# Patient Record
Sex: FEMALE | Race: WHITE | Employment: OTHER | URBAN - METROPOLITAN AREA
[De-identification: names, ages, dates, MRNs, and addresses within clinical notes are randomized per-mention and may not be internally consistent; named-entity substitution may affect disease eponyms.]

---

## 2019-01-07 RX ORDER — SODIUM CHLORIDE 0.9 % (FLUSH) 0.9 %
10 SYRINGE (ML) INJECTION PRN
Status: CANCELLED | OUTPATIENT
Start: 2019-01-07

## 2019-01-08 ENCOUNTER — HOSPITAL ENCOUNTER (OUTPATIENT)
Dept: INTERVENTIONAL RADIOLOGY/VASCULAR | Age: 64
Discharge: HOME | End: 2019-01-08
Payer: MEDICARE

## 2019-01-08 ENCOUNTER — HOSPITAL ENCOUNTER (OUTPATIENT)
Dept: CT IMAGING | Age: 64
Discharge: HOME | End: 2019-01-08
Payer: MEDICARE

## 2019-01-08 VITALS
DIASTOLIC BLOOD PRESSURE: 72 MMHG | RESPIRATION RATE: 16 BRPM | HEART RATE: 80 BPM | SYSTOLIC BLOOD PRESSURE: 172 MMHG | OXYGEN SATURATION: 97 % | TEMPERATURE: 97.4 F

## 2019-01-08 DIAGNOSIS — R80.8 LOW MOLECULAR WEIGHT PROTEINURIA WITH HYPERCALCIURIA AND NEPHROCALCINOSIS: ICD-10-CM

## 2019-01-08 DIAGNOSIS — E83.59 LOW MOLECULAR WEIGHT PROTEINURIA WITH HYPERCALCIURIA AND NEPHROCALCINOSIS: ICD-10-CM

## 2019-01-08 DIAGNOSIS — N29 LOW MOLECULAR WEIGHT PROTEINURIA WITH HYPERCALCIURIA AND NEPHROCALCINOSIS: ICD-10-CM

## 2019-01-08 LAB
APTT: 55.7 SECONDS (ref 21.2–33)
HCT VFR BLD CALC: 34.8 % (ref 37–47)
HCT VFR BLD CALC: 36.3 % (ref 37–47)
HEMOGLOBIN: 11.5 GM/DL (ref 12.5–16)
HEMOGLOBIN: 12 GM/DL (ref 12.5–16)
INR BLD: 1.03 INDEX
MCH RBC QN AUTO: 31.4 PG (ref 27–31)
MCHC RBC AUTO-ENTMCNC: 33.1 % (ref 32–36)
MCV RBC AUTO: 95 FL (ref 78–100)
PDW BLD-RTO: 13 % (ref 11.7–14.9)
PLATELET # BLD: 366 K/CU MM (ref 140–440)
PMV BLD AUTO: 9.3 FL (ref 7.5–11.1)
PROTHROMBIN TIME: 11.9 SECONDS (ref 9.12–12.5)
RBC # BLD: 3.82 M/CU MM (ref 4.2–5.4)
WBC # BLD: 9.5 K/CU MM (ref 4–10.5)

## 2019-01-08 PROCEDURE — 6360000002 HC RX W HCPCS: Performed by: RADIOLOGY

## 2019-01-08 PROCEDURE — 36415 COLL VENOUS BLD VENIPUNCTURE: CPT

## 2019-01-08 PROCEDURE — 88334 PATH CONSLTJ SURG CYTO XM EA: CPT

## 2019-01-08 PROCEDURE — 85018 HEMOGLOBIN: CPT

## 2019-01-08 PROCEDURE — 88305 TISSUE EXAM BY PATHOLOGIST: CPT

## 2019-01-08 PROCEDURE — 7100000010 HC PHASE II RECOVERY - FIRST 15 MIN

## 2019-01-08 PROCEDURE — 2580000003 HC RX 258: Performed by: RADIOLOGY

## 2019-01-08 PROCEDURE — 88333 PATH CONSLTJ SURG CYTO XM 1: CPT

## 2019-01-08 PROCEDURE — 50200 RENAL BIOPSY PERQ: CPT

## 2019-01-08 PROCEDURE — 2709999900 HC NON-CHARGEABLE SUPPLY

## 2019-01-08 PROCEDURE — 76942 ECHO GUIDE FOR BIOPSY: CPT

## 2019-01-08 PROCEDURE — 6370000000 HC RX 637 (ALT 250 FOR IP): Performed by: INTERNAL MEDICINE

## 2019-01-08 PROCEDURE — 85027 COMPLETE CBC AUTOMATED: CPT

## 2019-01-08 PROCEDURE — 85610 PROTHROMBIN TIME: CPT

## 2019-01-08 PROCEDURE — 7100000011 HC PHASE II RECOVERY - ADDTL 15 MIN

## 2019-01-08 PROCEDURE — 85014 HEMATOCRIT: CPT

## 2019-01-08 PROCEDURE — 85730 THROMBOPLASTIN TIME PARTIAL: CPT

## 2019-01-08 RX ORDER — LOVASTATIN 20 MG/1
20 TABLET ORAL EVERY EVENING
COMMUNITY
End: 2021-06-11 | Stop reason: ALTCHOICE

## 2019-01-08 RX ORDER — SODIUM CHLORIDE 0.9 % (FLUSH) 0.9 %
10 SYRINGE (ML) INJECTION PRN
Status: DISCONTINUED | OUTPATIENT
Start: 2019-01-08 | End: 2019-01-09 | Stop reason: HOSPADM

## 2019-01-08 RX ORDER — ONDANSETRON 4 MG/1
4 TABLET, FILM COATED ORAL EVERY 8 HOURS PRN
COMMUNITY
End: 2021-06-11 | Stop reason: ALTCHOICE

## 2019-01-08 RX ORDER — ALLOPURINOL 100 MG/1
100 TABLET ORAL DAILY
COMMUNITY
End: 2021-06-11 | Stop reason: ALTCHOICE

## 2019-01-08 RX ORDER — HYDRALAZINE HYDROCHLORIDE 25 MG/1
25 TABLET, FILM COATED ORAL ONCE
Status: COMPLETED | OUTPATIENT
Start: 2019-01-08 | End: 2019-01-08

## 2019-01-08 RX ADMIN — HYDRALAZINE HYDROCHLORIDE 10 MG: 20 INJECTION INTRAMUSCULAR; INTRAVENOUS at 10:50

## 2019-01-08 RX ADMIN — HYDRALAZINE HYDROCHLORIDE 25 MG: 25 TABLET ORAL at 13:00

## 2019-01-08 ASSESSMENT — PAIN SCALES - GENERAL
PAINLEVEL_OUTOF10: 0

## 2019-01-08 ASSESSMENT — PAIN - FUNCTIONAL ASSESSMENT
PAIN_FUNCTIONAL_ASSESSMENT: 0-10
PAIN_FUNCTIONAL_ASSESSMENT: 0-10

## 2019-06-25 ENCOUNTER — HOSPITAL ENCOUNTER (OUTPATIENT)
Dept: INTERVENTIONAL RADIOLOGY/VASCULAR | Age: 64
Discharge: HOME | End: 2019-06-25
Payer: MEDICARE

## 2019-06-25 VITALS
SYSTOLIC BLOOD PRESSURE: 138 MMHG | WEIGHT: 146 LBS | BODY MASS INDEX: 29.43 KG/M2 | DIASTOLIC BLOOD PRESSURE: 70 MMHG | HEIGHT: 59 IN | TEMPERATURE: 96.8 F | RESPIRATION RATE: 18 BRPM | OXYGEN SATURATION: 98 % | HEART RATE: 62 BPM

## 2019-06-25 DIAGNOSIS — N18.6 ESRD (END STAGE RENAL DISEASE) (HCC): ICD-10-CM

## 2019-06-25 LAB
APTT: 65.8 SECONDS (ref 21.2–33)
GLUCOSE BLD-MCNC: 97 MG/DL (ref 70–99)
HCT VFR BLD CALC: 24.7 % (ref 37–47)
HEMOGLOBIN: 7.7 GM/DL (ref 12.5–16)
INR BLD: 1.03 INDEX
MCH RBC QN AUTO: 31.8 PG (ref 27–31)
MCHC RBC AUTO-ENTMCNC: 31.2 % (ref 32–36)
MCV RBC AUTO: 102.1 FL (ref 78–100)
PDW BLD-RTO: 13.2 % (ref 11.7–14.9)
PLATELET # BLD: 317 K/CU MM (ref 140–440)
PMV BLD AUTO: 9.7 FL (ref 7.5–11.1)
PROTHROMBIN TIME: 11.7 SECONDS (ref 9.12–12.5)
RBC # BLD: 2.42 M/CU MM (ref 4.2–5.4)
REASON FOR REJECTION: NORMAL
REASON FOR REJECTION: NORMAL
REJECTED TEST: NORMAL
WBC # BLD: 8.5 K/CU MM (ref 4–10.5)

## 2019-06-25 PROCEDURE — 85027 COMPLETE CBC AUTOMATED: CPT

## 2019-06-25 PROCEDURE — 6360000002 HC RX W HCPCS: Performed by: RADIOLOGY

## 2019-06-25 PROCEDURE — C1751 CATH, INF, PER/CENT/MIDLINE: HCPCS

## 2019-06-25 PROCEDURE — 85610 PROTHROMBIN TIME: CPT

## 2019-06-25 PROCEDURE — C1894 INTRO/SHEATH, NON-LASER: HCPCS

## 2019-06-25 PROCEDURE — 7100000010 HC PHASE II RECOVERY - FIRST 15 MIN

## 2019-06-25 PROCEDURE — 76937 US GUIDE VASCULAR ACCESS: CPT

## 2019-06-25 PROCEDURE — 84132 ASSAY OF SERUM POTASSIUM: CPT

## 2019-06-25 PROCEDURE — 2580000003 HC RX 258: Performed by: RADIOLOGY

## 2019-06-25 PROCEDURE — 7100000011 HC PHASE II RECOVERY - ADDTL 15 MIN

## 2019-06-25 PROCEDURE — 2709999900 HC NON-CHARGEABLE SUPPLY

## 2019-06-25 PROCEDURE — 82962 GLUCOSE BLOOD TEST: CPT

## 2019-06-25 PROCEDURE — 77001 FLUOROGUIDE FOR VEIN DEVICE: CPT

## 2019-06-25 PROCEDURE — 85730 THROMBOPLASTIN TIME PARTIAL: CPT

## 2019-06-25 PROCEDURE — 36558 INSERT TUNNELED CV CATH: CPT

## 2019-06-25 RX ORDER — HYDRALAZINE HYDROCHLORIDE 50 MG/1
50 TABLET, FILM COATED ORAL 3 TIMES DAILY
COMMUNITY
End: 2021-06-11 | Stop reason: ALTCHOICE

## 2019-06-25 RX ORDER — SODIUM BICARBONATE 650 MG/1
650 TABLET ORAL 3 TIMES DAILY
Status: ON HOLD | COMMUNITY
End: 2019-08-03 | Stop reason: ALTCHOICE

## 2019-06-25 RX ORDER — MIDAZOLAM HYDROCHLORIDE 1 MG/ML
1.5 INJECTION INTRAMUSCULAR; INTRAVENOUS ONCE
Status: COMPLETED | OUTPATIENT
Start: 2019-06-25 | End: 2019-06-25

## 2019-06-25 RX ORDER — SODIUM CHLORIDE 0.9 % (FLUSH) 0.9 %
10 SYRINGE (ML) INJECTION PRN
Status: DISCONTINUED | OUTPATIENT
Start: 2019-06-25 | End: 2019-06-26 | Stop reason: HOSPADM

## 2019-06-25 RX ORDER — FENTANYL CITRATE 50 UG/ML
75 INJECTION, SOLUTION INTRAMUSCULAR; INTRAVENOUS ONCE
Status: COMPLETED | OUTPATIENT
Start: 2019-06-25 | End: 2019-06-25

## 2019-06-25 RX ADMIN — FENTANYL CITRATE 75 MCG: 50 INJECTION INTRAMUSCULAR; INTRAVENOUS at 14:56

## 2019-06-25 RX ADMIN — SODIUM CHLORIDE, PRESERVATIVE FREE 10 ML: 5 INJECTION INTRAVENOUS at 12:31

## 2019-06-25 RX ADMIN — MIDAZOLAM HYDROCHLORIDE 1.5 MG: 1 INJECTION, SOLUTION INTRAMUSCULAR; INTRAVENOUS at 14:57

## 2019-06-25 ASSESSMENT — PAIN SCALES - GENERAL
PAINLEVEL_OUTOF10: 0
PAINLEVEL_OUTOF10: 0

## 2019-06-25 NOTE — H&P
Date:6/25/2019  Name:Lupe Toure   XNI:[id number]   CU#:[id number]    SEX:female   Referring Physician:  Ann Almanzar  Primary Physician:  Alvin  Chief Complaint:  Renal insufficiency  History of Present Illness:   Renal insufficency    HISTORY AND PHYSICAL  ESRD (end stage renal disease) ([facility] [facility] [N18.6]    Past Medical History:  Past Medical History:   Diagnosis Date    Abscess, perineum 1/6/2016    Chronic kidney disease     CKD stage 4 - for renal bx 1/8/2019    Diabetes 1.5, managed as type 1 ([facility] [address] 1/6/2016    Diabetes mellitus ([facility] [address]     20+ yrs    Gout     on allopurinal    Hemodialysis patient ([facility] [address]     per notes per Dr Ann Almanzar- pt was on HD for 2 months in 2011 due to ATN/ per pt on 6/24/2019\"tried to do dialysis 2 weeks ago and did not work- could not finish it so now putting in dialysis cath ( placement) 6/25/2019    Hx of pancreatitis     2011    Hyperlipidemia     Hypertension     Macular degeneration     \"right eyes- have to use magnifying glass\"    Open wound of genital labia     Proteinuria        Past Surgical History:  Past Surgical History:   Procedure Laterality Date    AV FISTULA CREATION Right 03/2019    \"done at [facility]\"    EYE SURGERY      \"laser surgery left eye and radiation in my right eye and surgery both eyes for cataract [id number]\"    FOOT SURGERY  1987    toe nail removed- dinora     KIDNEY BIOPSY      per old chart done 1/2019    TUBAL LIGATION  40+ yrs ago    TUNNELED VENOUS CATHETER PLACEMENT Left 06/25/2019    Dr Irean Cranker       Social History:  Social History     Socioeconomic History    Marital status:       Spouse name: Not on file    Number of children: Not on file    Years of education: Not on file    Highest education level: Not on file   Occupational History    Not on file   Social Needs    Financial resource strain: Not on file    Food insecurity:     Worry: Not on file     Inability: Not on file    Transportation needs:     Medical: Not on file Non-medical: Not on file   Tobacco Use    Smoking status: Former Smoker     Packs/day: 1.25     Years: 45.00     Pack years: 56.25     Types: Cigarettes     Last attempt to quit: 2017     Years since quittin.4    Smokeless tobacco: Never Used   Substance and Sexual Activity    Alcohol use: No    Drug use: No    Sexual activity: Not Currently   Lifestyle    Physical activity:     Days per week: Not on file     Minutes per session: Not on file    Stress: Not on file   Relationships    Social connections:     Talks on phone: Not on file     Gets together: Not on file     Attends [employer] service: Not on file     Active member of club or organization: Not on file     Attends meetings of clubs or organizations: Not on file     Relationship status: Not on file    Intimate partner violence:     Fear of current or ex partner: Not on file     Emotionally abused: Not on file     Physically abused: Not on file     Forced sexual activity: Not on file   Other Topics Concern    Not on file   Social History Narrative    Not on file       Family History:  Family History   Problem Relation Age of Onset    Diabetes Mother     High Blood Pressure Mother     Heart Disease Father        Allergies:   Allergies   Allergen Reactions    Tape [place] Tape] Itching and Rash     Paper tape OK      Amlodipine Besylate      Passed out     Terramycin [Oxytetracycline] Other (See Comments)     \"swelling of my face\"       Medications:  Current Outpatient Medications on File Prior to Encounter   Medication Sig Dispense Refill    sodium bicarbonate 650 MG tablet Take 650 mg by mouth 3 times daily      hydrALAZINE (APRESOLINE) 50 MG tablet Take 50 mg by mouth 3 times daily      insulin detemir (LEVEMIR) 100 UNIT/ML injection vial Inject 51 Units into the skin nightly       lovastatin (MEVACOR) 20 MG tablet Take 20 mg by mouth every evening      allopurinol (ZYLOPRIM) 100 MG tablet Take 100 mg by mouth daily      ondansetron (ZOFRAN) 4 MG tablet Take 4 mg by mouth every 8 hours as needed for Nausea or Vomiting      carvedilol (COREG) 25 MG tablet Take 25 mg by mouth 2 times daily (with meals)      furosemide (LASIX) 40 MG tablet Take 80 mg by mouth daily       gemfibrozil (LOPID) 600 MG tablet Take 600 mg by mouth 2 times daily (before meals)      dapagliflozin (FARXIGA) 10 MG tablet Take 10 mg by mouth every morning      gabapentin (NEURONTIN) 300 MG capsule Take 300 mg by mouth 3 times daily      glipiZIDE (GLUCOTROL) 10 MG tablet Take 10 mg by mouth 2 times daily (before meals)      insulin lispro (HUMALOG) 100 UNIT/ML injection cartridge Inject 10 Units into the skin 3 times daily (before meals) Sliding scale       LOSARTAN POTASSIUM PO Take by mouth      METFORMIN HCL PO Take 500 mg by mouth      pravastatin (PRAVACHOL) 40 MG tablet Take 40 mg by mouth daily       No current facility-administered medications on file prior to encounter. Vital Signs:  @FLOWDT(6:last)@ @FLOWSTATM(6:24)@ @FLOWDT(5:last)@ @FLOWDT(8:last)@ @FLOWDT(9:last)@ @FLOWDT(10:last)@   @FLOWDT(14:first)@  @FLOWDT(14:last)@  Body mass index is 29.49 kg/m². Laboratory:  Recent Labs     06/25/19  1230   WBC 8.5   INR 1.03     INR @LABR24(INR)@      Impression:  Active Problems:    * No active hospital problems. *  Resolved Problems:    * No resolved hospital problems.  *          Mallampati Score 2  ASA class 2    PLAN OF CARE/PLANNED PROCEDURE    IR TUNNELED CATHETER PLACEMENT GREATER THAN 5 YEARS [34346]

## 2019-08-03 ENCOUNTER — HOSPITAL ENCOUNTER (INPATIENT)
Age: 64
LOS: 3 days | Discharge: HOME | DRG: 280 | End: 2019-08-06
Attending: INTERNAL MEDICINE | Admitting: INTERNAL MEDICINE
Payer: MEDICARE

## 2019-08-03 ENCOUNTER — APPOINTMENT (OUTPATIENT)
Dept: GENERAL RADIOLOGY | Age: 64
DRG: 280 | End: 2019-08-03
Attending: INTERNAL MEDICINE
Payer: MEDICARE

## 2019-08-03 DIAGNOSIS — N28.9 RENAL INSUFFICIENCY: ICD-10-CM

## 2019-08-03 PROBLEM — R00.1 SINUS BRADYCARDIA: Status: ACTIVE | Noted: 2019-08-03

## 2019-08-03 LAB
ALBUMIN SERPL-MCNC: 2.9 GM/DL (ref 3.4–5)
ALP BLD-CCNC: 101 IU/L (ref 40–129)
ALT SERPL-CCNC: 7 U/L (ref 10–40)
ANION GAP SERPL CALCULATED.3IONS-SCNC: 11 MMOL/L (ref 4–16)
APTT: 86.6 SECONDS (ref 21.2–33)
AST SERPL-CCNC: 12 IU/L (ref 15–37)
BASOPHILS ABSOLUTE: 0 K/CU MM
BASOPHILS RELATIVE PERCENT: 0.2 % (ref 0–1)
BILIRUB SERPL-MCNC: 0.1 MG/DL (ref 0–1)
BILIRUBIN DIRECT: 0.1 MG/DL (ref 0–0.3)
BILIRUBIN, INDIRECT: 0 MG/DL (ref 0–0.7)
BUN BLDV-MCNC: 18 MG/DL (ref 6–23)
CALCIUM SERPL-MCNC: 8.2 MG/DL (ref 8.3–10.6)
CHLORIDE BLD-SCNC: 103 MMOL/L (ref 99–110)
CO2: 24 MMOL/L (ref 21–32)
CREAT SERPL-MCNC: 4.1 MG/DL (ref 0.6–1.1)
DIFFERENTIAL TYPE: ABNORMAL
EOSINOPHILS ABSOLUTE: 0.1 K/CU MM
EOSINOPHILS RELATIVE PERCENT: 1.6 % (ref 0–3)
ESTIMATED AVERAGE GLUCOSE: 94 MG/DL
FERRITIN: 668 NG/ML (ref 15–150)
FOLATE: 13.3 NG/ML (ref 3.1–17.5)
GFR AFRICAN AMERICAN: 13 ML/MIN/1.73M2
GFR NON-AFRICAN AMERICAN: 11 ML/MIN/1.73M2
GLUCOSE BLD-MCNC: 118 MG/DL (ref 70–99)
GLUCOSE BLD-MCNC: 122 MG/DL (ref 70–99)
GLUCOSE BLD-MCNC: 84 MG/DL (ref 70–99)
GLUCOSE BLD-MCNC: 88 MG/DL (ref 70–99)
HBA1C MFR BLD: 4.9 % (ref 4.2–6.3)
HCT VFR BLD CALC: 23.5 % (ref 37–47)
HEMOGLOBIN: ABNORMAL GM/DL (ref 12.5–16)
IMMATURE NEUTROPHIL %: 0.5 % (ref 0–0.43)
INR BLD: 1.31 INDEX
IRON: 30 UG/DL (ref 37–145)
LYMPHOCYTES ABSOLUTE: 1.6 K/CU MM
LYMPHOCYTES RELATIVE PERCENT: 18.9 % (ref 24–44)
MAGNESIUM: 2.2 MG/DL (ref 1.8–2.4)
MCH RBC QN AUTO: 33.3 PG (ref 27–31)
MCHC RBC AUTO-ENTMCNC: 29.4 % (ref 32–36)
MCV RBC AUTO: 113.5 FL (ref 78–100)
MONOCYTES ABSOLUTE: 1 K/CU MM
MONOCYTES RELATIVE PERCENT: 11.4 % (ref 0–4)
NUCLEATED RBC %: 0 %
PCT TRANSFERRIN: 13 % (ref 10–44)
PDW BLD-RTO: 15.9 % (ref 11.7–14.9)
PLATELET # BLD: 306 K/CU MM (ref 140–440)
PMV BLD AUTO: 9.4 FL (ref 7.5–11.1)
POTASSIUM SERPL-SCNC: 4.1 MMOL/L (ref 3.5–5.1)
PROTHROMBIN TIME: 14.9 SECONDS (ref 9.12–12.5)
RBC # BLD: 2.07 M/CU MM (ref 4.2–5.4)
SEGMENTED NEUTROPHILS ABSOLUTE COUNT: 5.7 K/CU MM
SEGMENTED NEUTROPHILS RELATIVE PERCENT: 67.4 % (ref 36–66)
SODIUM BLD-SCNC: 138 MMOL/L (ref 135–145)
TOTAL IMMATURE NEUTOROPHIL: 0.04 K/CU MM
TOTAL IRON BINDING CAPACITY: 236 UG/DL (ref 250–450)
TOTAL NUCLEATED RBC: 0 K/CU MM
TOTAL PROTEIN: 4.8 GM/DL (ref 6.4–8.2)
TROPONIN T: 0.15 NG/ML
TROPONIN T: 0.16 NG/ML
TSH HIGH SENSITIVITY: 1.78 UIU/ML (ref 0.27–4.2)
UNSATURATED IRON BINDING CAPACITY: 206 UG/DL (ref 110–370)
VITAMIN B-12: 908.4 PG/ML (ref 211–911)
WBC # BLD: 8.4 K/CU MM (ref 4–10.5)

## 2019-08-03 PROCEDURE — 1200000000 HC SEMI PRIVATE

## 2019-08-03 PROCEDURE — 86922 COMPATIBILITY TEST ANTIGLOB: CPT

## 2019-08-03 PROCEDURE — 82746 ASSAY OF FOLIC ACID SERUM: CPT

## 2019-08-03 PROCEDURE — 85730 THROMBOPLASTIN TIME PARTIAL: CPT

## 2019-08-03 PROCEDURE — 84484 ASSAY OF TROPONIN QUANT: CPT

## 2019-08-03 PROCEDURE — 6360000002 HC RX W HCPCS: Performed by: INTERNAL MEDICINE

## 2019-08-03 PROCEDURE — 84443 ASSAY THYROID STIM HORMONE: CPT

## 2019-08-03 PROCEDURE — 86901 BLOOD TYPING SEROLOGIC RH(D): CPT

## 2019-08-03 PROCEDURE — 82962 GLUCOSE BLOOD TEST: CPT

## 2019-08-03 PROCEDURE — 2580000003 HC RX 258: Performed by: INTERNAL MEDICINE

## 2019-08-03 PROCEDURE — 83550 IRON BINDING TEST: CPT

## 2019-08-03 PROCEDURE — 94761 N-INVAS EAR/PLS OXIMETRY MLT: CPT

## 2019-08-03 PROCEDURE — 85025 COMPLETE CBC W/AUTO DIFF WBC: CPT

## 2019-08-03 PROCEDURE — 83036 HEMOGLOBIN GLYCOSYLATED A1C: CPT

## 2019-08-03 PROCEDURE — 71045 X-RAY EXAM CHEST 1 VIEW: CPT

## 2019-08-03 PROCEDURE — 36415 COLL VENOUS BLD VENIPUNCTURE: CPT

## 2019-08-03 PROCEDURE — 99221 1ST HOSP IP/OBS SF/LOW 40: CPT | Performed by: INTERNAL MEDICINE

## 2019-08-03 PROCEDURE — 71046 X-RAY EXAM CHEST 2 VIEWS: CPT

## 2019-08-03 PROCEDURE — 93005 ELECTROCARDIOGRAM TRACING: CPT | Performed by: INTERNAL MEDICINE

## 2019-08-03 PROCEDURE — 86900 BLOOD TYPING SEROLOGIC ABO: CPT

## 2019-08-03 PROCEDURE — P9016 RBC LEUKOCYTES REDUCED: HCPCS

## 2019-08-03 PROCEDURE — 85610 PROTHROMBIN TIME: CPT

## 2019-08-03 PROCEDURE — 6370000000 HC RX 637 (ALT 250 FOR IP): Performed by: INTERNAL MEDICINE

## 2019-08-03 PROCEDURE — 82607 VITAMIN B-12: CPT

## 2019-08-03 PROCEDURE — 86850 RBC ANTIBODY SCREEN: CPT

## 2019-08-03 PROCEDURE — 36430 TRANSFUSION BLD/BLD COMPNT: CPT

## 2019-08-03 PROCEDURE — 82728 ASSAY OF FERRITIN: CPT

## 2019-08-03 PROCEDURE — 83540 ASSAY OF IRON: CPT

## 2019-08-03 PROCEDURE — 83735 ASSAY OF MAGNESIUM: CPT

## 2019-08-03 PROCEDURE — 80053 COMPREHEN METABOLIC PANEL: CPT

## 2019-08-03 PROCEDURE — 82248 BILIRUBIN DIRECT: CPT

## 2019-08-03 RX ORDER — NICOTINE POLACRILEX 4 MG
15 LOZENGE BUCCAL PRN
Status: DISCONTINUED | OUTPATIENT
Start: 2019-08-03 | End: 2019-08-06 | Stop reason: HOSPADM

## 2019-08-03 RX ORDER — SODIUM CHLORIDE 0.9 % (FLUSH) 0.9 %
10 SYRINGE (ML) INJECTION EVERY 12 HOURS SCHEDULED
Status: DISCONTINUED | OUTPATIENT
Start: 2019-08-03 | End: 2019-08-06 | Stop reason: HOSPADM

## 2019-08-03 RX ORDER — HEPARIN SODIUM 5000 [USP'U]/ML
5000 INJECTION, SOLUTION INTRAVENOUS; SUBCUTANEOUS EVERY 8 HOURS SCHEDULED
Status: DISCONTINUED | OUTPATIENT
Start: 2019-08-03 | End: 2019-08-06 | Stop reason: HOSPADM

## 2019-08-03 RX ORDER — GEMFIBROZIL 600 MG/1
600 TABLET, FILM COATED ORAL
Status: DISCONTINUED | OUTPATIENT
Start: 2019-08-04 | End: 2019-08-06 | Stop reason: HOSPADM

## 2019-08-03 RX ORDER — FUROSEMIDE 40 MG/1
80 TABLET ORAL DAILY
Status: DISCONTINUED | OUTPATIENT
Start: 2019-08-03 | End: 2019-08-03

## 2019-08-03 RX ORDER — HYDRALAZINE HYDROCHLORIDE 50 MG/1
50 TABLET, FILM COATED ORAL 3 TIMES DAILY
Status: DISCONTINUED | OUTPATIENT
Start: 2019-08-03 | End: 2019-08-03

## 2019-08-03 RX ORDER — GABAPENTIN 300 MG/1
300 CAPSULE ORAL 3 TIMES DAILY
Status: DISCONTINUED | OUTPATIENT
Start: 2019-08-03 | End: 2019-08-06 | Stop reason: HOSPADM

## 2019-08-03 RX ORDER — SEVELAMER CARBONATE 800 MG/1
1 TABLET, FILM COATED ORAL 3 TIMES DAILY
COMMUNITY
End: 2021-06-11 | Stop reason: ALTCHOICE

## 2019-08-03 RX ORDER — INSULIN GLARGINE 100 [IU]/ML
5 INJECTION, SOLUTION SUBCUTANEOUS NIGHTLY
Status: DISCONTINUED | OUTPATIENT
Start: 2019-08-03 | End: 2019-08-06 | Stop reason: HOSPADM

## 2019-08-03 RX ORDER — SEVELAMER CARBONATE 800 MG/1
800 TABLET, FILM COATED ORAL 3 TIMES DAILY
Status: DISCONTINUED | OUTPATIENT
Start: 2019-08-03 | End: 2019-08-06 | Stop reason: HOSPADM

## 2019-08-03 RX ORDER — PRAVASTATIN SODIUM 40 MG
40 TABLET ORAL NIGHTLY
Status: DISCONTINUED | OUTPATIENT
Start: 2019-08-03 | End: 2019-08-06 | Stop reason: HOSPADM

## 2019-08-03 RX ORDER — DEXTROSE MONOHYDRATE 25 G/50ML
12.5 INJECTION, SOLUTION INTRAVENOUS PRN
Status: DISCONTINUED | OUTPATIENT
Start: 2019-08-03 | End: 2019-08-06 | Stop reason: HOSPADM

## 2019-08-03 RX ORDER — 0.9 % SODIUM CHLORIDE 0.9 %
250 INTRAVENOUS SOLUTION INTRAVENOUS ONCE
Status: COMPLETED | OUTPATIENT
Start: 2019-08-03 | End: 2019-08-04

## 2019-08-03 RX ORDER — SODIUM BICARBONATE 650 MG/1
650 TABLET ORAL 3 TIMES DAILY
Status: DISCONTINUED | OUTPATIENT
Start: 2019-08-03 | End: 2019-08-05

## 2019-08-03 RX ORDER — ALLOPURINOL 100 MG/1
100 TABLET ORAL DAILY
Status: DISCONTINUED | OUTPATIENT
Start: 2019-08-03 | End: 2019-08-06 | Stop reason: HOSPADM

## 2019-08-03 RX ORDER — DEXTROSE MONOHYDRATE 50 MG/ML
100 INJECTION, SOLUTION INTRAVENOUS PRN
Status: DISCONTINUED | OUTPATIENT
Start: 2019-08-03 | End: 2019-08-06 | Stop reason: HOSPADM

## 2019-08-03 RX ORDER — SODIUM CHLORIDE 0.9 % (FLUSH) 0.9 %
10 SYRINGE (ML) INJECTION PRN
Status: DISCONTINUED | OUTPATIENT
Start: 2019-08-03 | End: 2019-08-06 | Stop reason: HOSPADM

## 2019-08-03 RX ORDER — ONDANSETRON 2 MG/ML
4 INJECTION INTRAMUSCULAR; INTRAVENOUS EVERY 6 HOURS PRN
Status: DISCONTINUED | OUTPATIENT
Start: 2019-08-03 | End: 2019-08-06 | Stop reason: HOSPADM

## 2019-08-03 RX ADMIN — HEPARIN SODIUM 5000 UNITS: 5000 INJECTION INTRAVENOUS; SUBCUTANEOUS at 12:58

## 2019-08-03 RX ADMIN — ALLOPURINOL 100 MG: 100 TABLET ORAL at 12:57

## 2019-08-03 RX ADMIN — SODIUM BICARBONATE 650 MG TABLET 650 MG: at 21:11

## 2019-08-03 RX ADMIN — FUROSEMIDE 80 MG: 40 TABLET ORAL at 12:57

## 2019-08-03 RX ADMIN — ONDANSETRON 4 MG: 2 INJECTION INTRAMUSCULAR; INTRAVENOUS at 11:04

## 2019-08-03 RX ADMIN — SODIUM CHLORIDE, PRESERVATIVE FREE 10 ML: 5 INJECTION INTRAVENOUS at 21:07

## 2019-08-03 RX ADMIN — SODIUM CHLORIDE, PRESERVATIVE FREE 10 ML: 5 INJECTION INTRAVENOUS at 12:15

## 2019-08-03 RX ADMIN — SODIUM CHLORIDE 250 ML: 9 INJECTION, SOLUTION INTRAVENOUS at 21:07

## 2019-08-03 RX ADMIN — PRAVASTATIN SODIUM 40 MG: 40 TABLET ORAL at 21:10

## 2019-08-03 ASSESSMENT — PAIN SCALES - GENERAL
PAINLEVEL_OUTOF10: 0

## 2019-08-03 NOTE — CONSULTS
(PRAVACHOL) tablet 40 mg Nightly   sodium bicarbonate tablet 650 mg TID   sodium chloride flush 0.9 % injection 10 mL 2 times per day   sodium chloride flush 0.9 % injection 10 mL PRN   magnesium hydroxide (MILK OF MAGNESIA) 400 MG/5ML suspension 30 mL Daily PRN   ondansetron (ZOFRAN) injection 4 mg Q6H PRN   heparin (porcine) injection 5,000 Units 3 times per day   glucose (GLUTOSE) 40 % oral gel 15 g PRN   dextrose 50 % IV solution PRN   glucagon (rDNA) injection 1 mg PRN   dextrose 5 % solution PRN   insulin lispro (HUMALOG) injection vial 0-6 Units TID WC   insulin lispro (HUMALOG) injection vial 0-3 Units Nightly   sevelamer (RENVELA) tablet 800 mg TID   0.9 % sodium chloride bolus Once     Current Facility-Administered Medications   Medication Dose Route Frequency Provider Last Rate Last Dose    allopurinol (ZYLOPRIM) tablet 100 mg  100 mg Oral Daily Veronica Cavazos MD   100 mg at 08/03/19 1257    gabapentin (NEURONTIN) capsule 300 mg  300 mg Oral TID Veronica Cavazos MD        [START ON 8/4/2019] gemfibrozil (LOPID) tablet 600 mg  600 mg Oral BID AC Veronica Cavazos MD        insulin glargine (LANTUS) injection vial 5 Units  5 Units Subcutaneous Nightly Veronica Cavazos MD        pravastatin (PRAVACHOL) tablet 40 mg  40 mg Oral Nightly Veronica Cavazos MD        sodium bicarbonate tablet 650 mg  650 mg Oral TID Veronica Cavazos MD        sodium chloride flush 0.9 % injection 10 mL  10 mL Intravenous 2 times per day Veronica Cavazos MD   10 mL at 08/03/19 1215    sodium chloride flush 0.9 % injection 10 mL  10 mL Intravenous PRN Veronica Cavazos MD        magnesium hydroxide (MILK OF MAGNESIA) 400 MG/5ML suspension 30 mL  30 mL Oral Daily PRN Veronica Cavazos MD        ondansetron [facility]) injection 4 mg  4 mg Intravenous Q6H PRN Veronica Cavazos MD   4 mg at 08/03/19 1104    heparin (porcine) injection 5,000 Units  5,000 Units Subcutaneous 3 times per day Juju IBANEZ kg/m². General Appearance:  No distress, conversant    Constitutional:  Well developed, Well nourished, No acute distress, Non-toxic appearance. HENT:  Normocephalic, Atraumatic, Bilateral external ears normal, Oropharynx moist, No oral exudates, Nose normal. Neck- Normal range of motion, No tenderness, Supple, No stridor,no apical-carotid delay, no carotid bruit  Eyes:  PERRL, EOMI, Conjunctiva normal, No discharge. Respiratory:  Normal breath sounds, No respiratory distress, No wheezing, No chest tenderness. ,no use of accessory muscles, diaphragm movement is normal  Cardiovascular: (PMI) apex non displaced,no lifts no thrills, no s3,no s4, Normal heart rate, Normal rhythm, No murmurs, No rubs, No gallops. Carotid arteries pulse and amplitude are normal no bruit, no abdominal bruit noted ( normal abdominal aorta ausculation), femoral arteries pulse and amplitude are normal no bruit, pedal pulses are normal  GI:  Bowel sounds normal, Soft, No tenderness, No masses, No pulsatile masses, no hepatosplenomegally, no bruits  : External genitalia appear normal, No masses or lesions. No discharge. No CVA tenderness. Musculoskeletal:  Intact distal pulses, No edema, No tenderness, No cyanosis, No clubbing. Good range of motion in all major joints. No tenderness to palpation or major deformities noted. Back- No tenderness. Integument:  Warm, Dry, No erythema, No rash. Skin: no rash, no ulcers  Lymphatic:  No lymphadenopathy noted. Neurologic:  Alert & oriented x 3, Normal motor function, Normal sensory function, No focal deficits noted.    Psychiatric:  Affect normal, Judgment normal, Mood normal.   Lab Review   Recent Labs     08/03/19  1135   WBC 8.4   HGB 6.9  HGB CALLED TO YURY DE LA CRUZ RN ON 08/03/2019 @ 1211 BY VINCE SMITH   RESULTS READ BACK  *   HCT 23.5*         Recent Labs     08/03/19  1135      K 4.1      CO2 24   BUN 18   CREATININE 4.1*     Recent Labs     08/03/19  1135   AST

## 2019-08-04 LAB
ABO/RH: NORMAL
ANION GAP SERPL CALCULATED.3IONS-SCNC: 11 MMOL/L (ref 4–16)
ANTIBODY SCREEN: NEGATIVE
BASOPHILS ABSOLUTE: 0.1 K/CU MM
BASOPHILS RELATIVE PERCENT: 0.5 % (ref 0–1)
BUN BLDV-MCNC: 23 MG/DL (ref 6–23)
CALCIUM SERPL-MCNC: 8.2 MG/DL (ref 8.3–10.6)
CHLORIDE BLD-SCNC: 102 MMOL/L (ref 99–110)
CO2: 24 MMOL/L (ref 21–32)
COMPONENT: NORMAL
CREAT SERPL-MCNC: 5.1 MG/DL (ref 0.6–1.1)
CROSSMATCH RESULT: NORMAL
DIFFERENTIAL TYPE: ABNORMAL
EKG ATRIAL RATE: 86 BPM
EKG DIAGNOSIS: NORMAL
EKG P AXIS: 34 DEGREES
EKG Q-T INTERVAL: 416 MS
EKG QRS DURATION: 76 MS
EKG QTC CALCULATION (BAZETT): 439 MS
EKG R AXIS: 12 DEGREES
EKG T AXIS: 29 DEGREES
EKG VENTRICULAR RATE: 67 BPM
EOSINOPHILS ABSOLUTE: 0.2 K/CU MM
EOSINOPHILS RELATIVE PERCENT: 1.6 % (ref 0–3)
GFR AFRICAN AMERICAN: 10 ML/MIN/1.73M2
GFR NON-AFRICAN AMERICAN: 8 ML/MIN/1.73M2
GLUCOSE BLD-MCNC: 101 MG/DL (ref 70–99)
GLUCOSE BLD-MCNC: 117 MG/DL (ref 70–99)
GLUCOSE BLD-MCNC: 74 MG/DL (ref 70–99)
GLUCOSE BLD-MCNC: 81 MG/DL (ref 70–99)
GLUCOSE BLD-MCNC: 92 MG/DL (ref 70–99)
HCT VFR BLD CALC: 26.3 % (ref 37–47)
HEMOGLOBIN: 8.3 GM/DL (ref 12.5–16)
IMMATURE NEUTROPHIL %: 0.4 % (ref 0–0.43)
LYMPHOCYTES ABSOLUTE: 1.6 K/CU MM
LYMPHOCYTES RELATIVE PERCENT: 15.8 % (ref 24–44)
MAGNESIUM: 2.1 MG/DL (ref 1.8–2.4)
MCH RBC QN AUTO: 32.4 PG (ref 27–31)
MCHC RBC AUTO-ENTMCNC: 31.6 % (ref 32–36)
MCV RBC AUTO: 102.7 FL (ref 78–100)
MONOCYTES ABSOLUTE: 1 K/CU MM
MONOCYTES RELATIVE PERCENT: 10.2 % (ref 0–4)
NUCLEATED RBC %: 0 %
PDW BLD-RTO: 17.1 % (ref 11.7–14.9)
PLATELET # BLD: 287 K/CU MM (ref 140–440)
PMV BLD AUTO: 9.2 FL (ref 7.5–11.1)
POTASSIUM SERPL-SCNC: 4.1 MMOL/L (ref 3.5–5.1)
RBC # BLD: 2.56 M/CU MM (ref 4.2–5.4)
SEGMENTED NEUTROPHILS ABSOLUTE COUNT: 7.1 K/CU MM
SEGMENTED NEUTROPHILS RELATIVE PERCENT: 71.5 % (ref 36–66)
SODIUM BLD-SCNC: 137 MMOL/L (ref 135–145)
STATUS: NORMAL
TOTAL IMMATURE NEUTOROPHIL: 0.04 K/CU MM
TOTAL NUCLEATED RBC: 0 K/CU MM
TRANSFUSION STATUS: NORMAL
TROPONIN T: 0.15 NG/ML
UNIT DIVISION: 0
UNIT NUMBER: NORMAL
WBC # BLD: 9.9 K/CU MM (ref 4–10.5)

## 2019-08-04 PROCEDURE — 1200000000 HC SEMI PRIVATE

## 2019-08-04 PROCEDURE — 6370000000 HC RX 637 (ALT 250 FOR IP): Performed by: INTERNAL MEDICINE

## 2019-08-04 PROCEDURE — 2580000003 HC RX 258: Performed by: INTERNAL MEDICINE

## 2019-08-04 PROCEDURE — 80048 BASIC METABOLIC PNL TOTAL CA: CPT

## 2019-08-04 PROCEDURE — 94761 N-INVAS EAR/PLS OXIMETRY MLT: CPT

## 2019-08-04 PROCEDURE — 36415 COLL VENOUS BLD VENIPUNCTURE: CPT

## 2019-08-04 PROCEDURE — 82962 GLUCOSE BLOOD TEST: CPT

## 2019-08-04 PROCEDURE — 93005 ELECTROCARDIOGRAM TRACING: CPT | Performed by: INTERNAL MEDICINE

## 2019-08-04 PROCEDURE — 84484 ASSAY OF TROPONIN QUANT: CPT

## 2019-08-04 PROCEDURE — 5A1D70Z PERFORMANCE OF URINARY FILTRATION, INTERMITTENT, LESS THAN 6 HOURS PER DAY: ICD-10-PCS | Performed by: INTERNAL MEDICINE

## 2019-08-04 PROCEDURE — 99232 SBSQ HOSP IP/OBS MODERATE 35: CPT | Performed by: INTERNAL MEDICINE

## 2019-08-04 PROCEDURE — 6360000002 HC RX W HCPCS: Performed by: INTERNAL MEDICINE

## 2019-08-04 PROCEDURE — 85025 COMPLETE CBC W/AUTO DIFF WBC: CPT

## 2019-08-04 PROCEDURE — 93010 ELECTROCARDIOGRAM REPORT: CPT | Performed by: INTERNAL MEDICINE

## 2019-08-04 PROCEDURE — 83735 ASSAY OF MAGNESIUM: CPT

## 2019-08-04 RX ORDER — LOPERAMIDE HYDROCHLORIDE 2 MG/1
2 CAPSULE ORAL 4 TIMES DAILY PRN
Status: DISCONTINUED | OUTPATIENT
Start: 2019-08-04 | End: 2019-08-06 | Stop reason: HOSPADM

## 2019-08-04 RX ORDER — FOLIC ACID/VIT B COMPLEX AND C 5 MG
1 TABLET ORAL DAILY
Status: DISCONTINUED | OUTPATIENT
Start: 2019-08-04 | End: 2019-08-06 | Stop reason: HOSPADM

## 2019-08-04 RX ORDER — HYDRALAZINE HYDROCHLORIDE 25 MG/1
25 TABLET, FILM COATED ORAL EVERY 12 HOURS SCHEDULED
Status: DISCONTINUED | OUTPATIENT
Start: 2019-08-04 | End: 2019-08-05

## 2019-08-04 RX ADMIN — SODIUM CHLORIDE, PRESERVATIVE FREE 10 ML: 5 INJECTION INTRAVENOUS at 21:01

## 2019-08-04 RX ADMIN — GABAPENTIN 300 MG: 300 CAPSULE ORAL at 13:49

## 2019-08-04 RX ADMIN — HEPARIN SODIUM 5000 UNITS: 5000 INJECTION INTRAVENOUS; SUBCUTANEOUS at 13:49

## 2019-08-04 RX ADMIN — HYDRALAZINE HYDROCHLORIDE 25 MG: 25 TABLET, FILM COATED ORAL at 21:00

## 2019-08-04 RX ADMIN — ALLOPURINOL 100 MG: 100 TABLET ORAL at 09:30

## 2019-08-04 RX ADMIN — GABAPENTIN 300 MG: 300 CAPSULE ORAL at 21:00

## 2019-08-04 RX ADMIN — SODIUM BICARBONATE 650 MG TABLET 650 MG: at 21:00

## 2019-08-04 RX ADMIN — SEVELAMER CARBONATE 800 MG: 800 TABLET, FILM COATED ORAL at 12:31

## 2019-08-04 RX ADMIN — HEPARIN SODIUM 5000 UNITS: 5000 INJECTION INTRAVENOUS; SUBCUTANEOUS at 21:00

## 2019-08-04 RX ADMIN — SEVELAMER CARBONATE 800 MG: 800 TABLET, FILM COATED ORAL at 17:21

## 2019-08-04 RX ADMIN — SODIUM BICARBONATE 650 MG TABLET 650 MG: at 09:30

## 2019-08-04 RX ADMIN — GEMFIBROZIL 600 MG: 600 TABLET ORAL at 17:21

## 2019-08-04 RX ADMIN — GABAPENTIN 300 MG: 300 CAPSULE ORAL at 09:30

## 2019-08-04 RX ADMIN — HYDRALAZINE HYDROCHLORIDE 25 MG: 25 TABLET, FILM COATED ORAL at 12:32

## 2019-08-04 RX ADMIN — GEMFIBROZIL 600 MG: 600 TABLET ORAL at 09:34

## 2019-08-04 RX ADMIN — SODIUM BICARBONATE 650 MG TABLET 650 MG: at 13:49

## 2019-08-04 RX ADMIN — PRAVASTATIN SODIUM 40 MG: 40 TABLET ORAL at 21:00

## 2019-08-04 RX ADMIN — LOPERAMIDE HYDROCHLORIDE 2 MG: 2 CAPSULE ORAL at 13:49

## 2019-08-04 RX ADMIN — SODIUM CHLORIDE, PRESERVATIVE FREE 10 ML: 5 INJECTION INTRAVENOUS at 09:31

## 2019-08-04 RX ADMIN — SEVELAMER CARBONATE 800 MG: 800 TABLET, FILM COATED ORAL at 09:30

## 2019-08-04 ASSESSMENT — PAIN SCALES - GENERAL
PAINLEVEL_OUTOF10: 0

## 2019-08-04 NOTE — CONSULTS
Consult to Nephrology  Consult performed by: Ishan Kaba MD  Consult ordered by: Myrna Cortez MD               [address] [address], [address] [address]  Phone: [phone]  Office Hours: 8:30AM - 4:30PM  Monday - Friday     Nephrology Service Consultation    Patient:  Savanah Saenz  MRN: [id number]  Consulting physician:  Myrna Cortez MD  Reason for Consult: ESRD on dialysis- patient of my partner Dr Carranza Raw From:  patient, electronic medical record  PCP: Honey Guerin CNP    HISTORY OF PRESENT ILLNESS:   The patient is a 71 y.o. female who presents with weakness- was bradycardic  Does not know what happened- no chest pain or soa  Daughter thinks that she may have taken extra meds  Received HD Friday at [facility] dialysis  Making urine      Past Medical History:        Diagnosis Date    Abscess, perineum 1/6/2016    Chronic kidney disease     CKD stage 4 - for renal bx 1/8/2019    Diabetes 1.5, managed as type 1 ([address] 1/6/2016    Diabetes mellitus ([address]     20+ yrs    Gout     on allopurinal    Hemodialysis patient ([facility] [address]     per notes per Dr Lucinda Jose- pt was on HD for 2 months in 2011 due to ATN/ per pt on 6/24/2019\"tried to do dialysis 2 weeks ago and did not work- could not finish it so now putting in dialysis cath ( placement) 6/25/2019    Hx of pancreatitis     2011    Hyperlipidemia     Hypertension     Macular degeneration     \"right eyes- have to use magnifying glass\"    Open wound of genital labia     Proteinuria        Past Surgical History:        Procedure Laterality Date    AV FISTULA CREATION Right 03/2019    \"done at [facility]\"    EYE SURGERY      \"laser surgery left eye and radiation in my right eye and surgery both eyes for cataract [id number]\"    FOOT SURGERY  1987    toe nail removed- dinora     KIDNEY BIOPSY      per old chart done 1/2019    TUBAL LIGATION  40+ yrs ago    TUNNELED VENOUS CATHETER PLACEMENT Left 06/25/2019     Bindu       Medications:   Scheduled Meds:   allopurinol  100 mg Oral Daily    gabapentin  300 mg Oral TID    gemfibrozil  600 mg Oral BID AC    insulin glargine  5 Units Subcutaneous Nightly    pravastatin  40 mg Oral Nightly    sodium bicarbonate  650 mg Oral TID    sodium chloride flush  10 mL Intravenous 2 times per day    heparin (porcine)  5,000 Units Subcutaneous 3 times per day    insulin lispro  0-6 Units Subcutaneous TID WC    insulin lispro  0-3 Units Subcutaneous Nightly    sevelamer  800 mg Oral TID     Continuous Infusions:   dextrose       PRN Meds:.sodium chloride flush, magnesium hydroxide, ondansetron, glucose, dextrose, glucagon (rDNA), dextrose    Allergies:  Tape [adhesive tape]; Amlodipine besylate; and Terramycin [oxytetracycline]    Social History:   TOBACCO:   reports that she quit smoking about 2 years ago. Her smoking use included cigarettes. She has a 56.25 pack-year smoking history. She has never used smokeless tobacco.  ETOH:   reports that she does not drink alcohol. OCCUPATION:      Family History:       Problem Relation Age of Onset    Diabetes Mother     High Blood Pressure Mother     Heart Disease Father        REVIEW OF SYSTEMS:  Negative except for weakness    Physical Exam:    Vitals: BP (!) 160/62   Pulse 69   Temp 97.8 °F (36.6 °C) (Oral)   Resp 17   Ht 4' 11\" (1.499 m)   Wt 137 lb 3.2 oz (62.2 kg)   LMP 01/07/2009   SpO2 96%   BMI 27.71 kg/m²   General appearance: alert, appears stated age and cooperative  Skin: Skin color, texture, turgor normal. No rashes or lesions  HEENT: Ears: Normal TM's bilaterally. Normal auditory canals and external ears. Non-tender.   Neck: no adenopathy, no carotid bruit, no JVD, supple, symmetrical, trachea midline and thyroid not enlarged, symmetric, no tenderness/mass/nodules  Lungs: clear to auscultation bilaterally  Heart: regular rate and rhythm, S1, S2 normal, no murmur, click, rub or gallop  Abdomen: soft, non-tender; bowel sounds normal; no masses,  no organomegaly  Extremities: extremities normal, atraumatic, no cyanosis or edema  Neurologic: Mental status: alertness: alert    CBC:   Recent Labs     08/03/19  1135 08/04/19  0143   WBC 8.4 9.9   HGB 6.9  HGB CALLED TO YURY DE LA CRUZ RN ON 08/03/2019 @ 1211 BY VINCE SMITH   RESULTS READ BACK  * 8.3*    287     BMP:    Recent Labs     08/03/19  1135 08/04/19  0143    137   K 4.1 4.1    102   CO2 24 24   BUN 18 23   CREATININE 4.1* 5.1*   GLUCOSE 84 81     Hepatic:   Recent Labs     08/03/19  1135   AST 12*   ALT 7*   BILITOT 0.1   ALKPHOS 101     Troponin: No results for input(s): TROPONINI in the last 72 hours. BNP: No results for input(s): BNP in the last 72 hours. Lipids: No results for input(s): CHOL, HDL in the last 72 hours.     Invalid input(s): LDLCALCU  ABGs: No results found for: PHART, PO2ART, DVL9JYY  INR:   Recent Labs     08/03/19  1135   INR 1.31     -----------------------------------------------------------------      Assessment and Recommendations     Patient Active Problem List   Diagnosis Code    Diabetes 1.5, managed as type 1 ([facility] [address] E13.9    Sinus bradycardia R00.1     IMP  Weakness with sinus bradycardia  Anemia symptomatic  ESRD  HTN primary uncontrolled  Suggest  Check iron studies if not done recently  Adjust iron and SHUBHAM therapy  May need to modify anti HTN regimen  Dialysis tomorrow  Dr Gigi Osman will follow in am      Nayely Brady MD

## 2019-08-04 NOTE — CONSULTS
insulin glargine  5 Units Subcutaneous Nightly    pravastatin  40 mg Oral Nightly    sodium bicarbonate  650 mg Oral TID    sodium chloride flush  10 mL Intravenous 2 times per day    heparin (porcine)  5,000 Units Subcutaneous 3 times per day    insulin lispro  0-6 Units Subcutaneous TID WC    insulin lispro  0-3 Units Subcutaneous Nightly    sevelamer  800 mg Oral TID    sodium chloride  250 mL Intravenous Once      Infusions:    dextrose           IMPRESSION    Patient Active Problem List   Diagnosis    Diabetes 1.5, managed as type 1 ([facility] [address]    Sinus bradycardia        End-stage kidney disease        on scheduled hemodialysis      RECOMMENDATIONS:      1. Reviewed POC blood glucose . Labs and X ray results   2. Reviewed Home and Current Medicines   3. Will Start On Correction bolus Humalog/very low dose Lantus Insulin regime / OHGD   4. Monitor Blood glucose frequently   5. Modify  the dose of Insulin  as needed        Will follow with you  Again thank you for sharing pt's care with me.      Truly yours,       Amanda Garcia MD

## 2019-08-05 ENCOUNTER — APPOINTMENT (OUTPATIENT)
Dept: INTERVENTIONAL RADIOLOGY/VASCULAR | Age: 64
DRG: 280 | End: 2019-08-05
Attending: INTERNAL MEDICINE
Payer: MEDICARE

## 2019-08-05 LAB
ANION GAP SERPL CALCULATED.3IONS-SCNC: 14 MMOL/L (ref 4–16)
BASOPHILS ABSOLUTE: 0.1 K/CU MM
BASOPHILS RELATIVE PERCENT: 0.9 % (ref 0–1)
BUN BLDV-MCNC: 32 MG/DL (ref 6–23)
CALCIUM SERPL-MCNC: 8.3 MG/DL (ref 8.3–10.6)
CHLORIDE BLD-SCNC: 103 MMOL/L (ref 99–110)
CO2: 22 MMOL/L (ref 21–32)
CREAT SERPL-MCNC: 6.4 MG/DL (ref 0.6–1.1)
DIFFERENTIAL TYPE: ABNORMAL
EKG ATRIAL RATE: 55 BPM
EKG DIAGNOSIS: NORMAL
EKG P AXIS: 34 DEGREES
EKG Q-T INTERVAL: 430 MS
EKG QRS DURATION: 78 MS
EKG QTC CALCULATION (BAZETT): 411 MS
EKG R AXIS: 25 DEGREES
EKG T AXIS: 40 DEGREES
EKG VENTRICULAR RATE: 55 BPM
EOSINOPHILS ABSOLUTE: 0.2 K/CU MM
EOSINOPHILS RELATIVE PERCENT: 2.1 % (ref 0–3)
GFR AFRICAN AMERICAN: 8 ML/MIN/1.73M2
GFR NON-AFRICAN AMERICAN: 7 ML/MIN/1.73M2
GLUCOSE BLD-MCNC: 100 MG/DL (ref 70–99)
GLUCOSE BLD-MCNC: 171 MG/DL (ref 70–99)
GLUCOSE BLD-MCNC: 214 MG/DL (ref 70–99)
GLUCOSE BLD-MCNC: 94 MG/DL (ref 70–99)
GLUCOSE BLD-MCNC: 94 MG/DL (ref 70–99)
GLUCOSE BLD-MCNC: 96 MG/DL (ref 70–99)
HBV SURFACE AB TITR SER: <3.5 {TITER}
HCT VFR BLD CALC: 28.7 % (ref 37–47)
HEMOGLOBIN: 8.7 GM/DL (ref 12.5–16)
HEPATITIS B SURFACE ANTIGEN: NON REACTIVE
IMMATURE NEUTROPHIL %: 0.6 % (ref 0–0.43)
LV EF: 58 %
LVEF MODALITY: NORMAL
LYMPHOCYTES ABSOLUTE: 1.7 K/CU MM
LYMPHOCYTES RELATIVE PERCENT: 16.5 % (ref 24–44)
MAGNESIUM: 2.2 MG/DL (ref 1.8–2.4)
MCH RBC QN AUTO: 31.9 PG (ref 27–31)
MCHC RBC AUTO-ENTMCNC: 30.3 % (ref 32–36)
MCV RBC AUTO: 105.1 FL (ref 78–100)
MONOCYTES ABSOLUTE: 1.1 K/CU MM
MONOCYTES RELATIVE PERCENT: 10.2 % (ref 0–4)
NUCLEATED RBC %: 0 %
PDW BLD-RTO: 16.6 % (ref 11.7–14.9)
PLATELET # BLD: 311 K/CU MM (ref 140–440)
PMV BLD AUTO: 9.7 FL (ref 7.5–11.1)
POTASSIUM SERPL-SCNC: 4.1 MMOL/L (ref 3.5–5.1)
RBC # BLD: 2.73 M/CU MM (ref 4.2–5.4)
SEGMENTED NEUTROPHILS ABSOLUTE COUNT: 7.3 K/CU MM
SEGMENTED NEUTROPHILS RELATIVE PERCENT: 69.7 % (ref 36–66)
SODIUM BLD-SCNC: 139 MMOL/L (ref 135–145)
TOTAL IMMATURE NEUTOROPHIL: 0.06 K/CU MM
TOTAL NUCLEATED RBC: 0 K/CU MM
WBC # BLD: 10.5 K/CU MM (ref 4–10.5)

## 2019-08-05 PROCEDURE — 93010 ELECTROCARDIOGRAM REPORT: CPT | Performed by: INTERNAL MEDICINE

## 2019-08-05 PROCEDURE — 80048 BASIC METABOLIC PNL TOTAL CA: CPT

## 2019-08-05 PROCEDURE — 97116 GAIT TRAINING THERAPY: CPT

## 2019-08-05 PROCEDURE — 2709999900 HC NON-CHARGEABLE SUPPLY

## 2019-08-05 PROCEDURE — 82962 GLUCOSE BLOOD TEST: CPT

## 2019-08-05 PROCEDURE — 6370000000 HC RX 637 (ALT 250 FOR IP): Performed by: INTERNAL MEDICINE

## 2019-08-05 PROCEDURE — 99222 1ST HOSP IP/OBS MODERATE 55: CPT | Performed by: INTERNAL MEDICINE

## 2019-08-05 PROCEDURE — 86706 HEP B SURFACE ANTIBODY: CPT

## 2019-08-05 PROCEDURE — 90935 HEMODIALYSIS ONE EVALUATION: CPT

## 2019-08-05 PROCEDURE — 36415 COLL VENOUS BLD VENIPUNCTURE: CPT

## 2019-08-05 PROCEDURE — 0JPVXXZ REMOVAL OF TUNNELED VASCULAR ACCESS DEVICE FROM UPPER EXTREMITY SUBCUTANEOUS TISSUE AND FASCIA, EXTERNAL APPROACH: ICD-10-PCS | Performed by: HOSPITALIST

## 2019-08-05 PROCEDURE — APPNB45 APP NON BILLABLE 31-45 MINUTES: Performed by: NURSE PRACTITIONER

## 2019-08-05 PROCEDURE — 2580000003 HC RX 258: Performed by: INTERNAL MEDICINE

## 2019-08-05 PROCEDURE — 6360000002 HC RX W HCPCS: Performed by: INTERNAL MEDICINE

## 2019-08-05 PROCEDURE — 94761 N-INVAS EAR/PLS OXIMETRY MLT: CPT

## 2019-08-05 PROCEDURE — 97162 PT EVAL MOD COMPLEX 30 MIN: CPT

## 2019-08-05 PROCEDURE — 83735 ASSAY OF MAGNESIUM: CPT

## 2019-08-05 PROCEDURE — 93306 TTE W/DOPPLER COMPLETE: CPT

## 2019-08-05 PROCEDURE — 36589 REMOVAL TUNNELED CV CATH: CPT

## 2019-08-05 PROCEDURE — 85025 COMPLETE CBC W/AUTO DIFF WBC: CPT

## 2019-08-05 PROCEDURE — 97530 THERAPEUTIC ACTIVITIES: CPT

## 2019-08-05 PROCEDURE — 87340 HEPATITIS B SURFACE AG IA: CPT

## 2019-08-05 PROCEDURE — 1200000000 HC SEMI PRIVATE

## 2019-08-05 RX ORDER — HYDRALAZINE HYDROCHLORIDE 25 MG/1
25 TABLET, FILM COATED ORAL EVERY 8 HOURS SCHEDULED
Status: DISCONTINUED | OUTPATIENT
Start: 2019-08-05 | End: 2019-08-06 | Stop reason: HOSPADM

## 2019-08-05 RX ADMIN — GABAPENTIN 300 MG: 300 CAPSULE ORAL at 09:08

## 2019-08-05 RX ADMIN — IRON SUCROSE 200 MG: 20 INJECTION, SOLUTION INTRAVENOUS at 12:19

## 2019-08-05 RX ADMIN — HEPARIN SODIUM 5000 UNITS: 5000 INJECTION INTRAVENOUS; SUBCUTANEOUS at 20:09

## 2019-08-05 RX ADMIN — ALLOPURINOL 100 MG: 100 TABLET ORAL at 09:08

## 2019-08-05 RX ADMIN — HEPARIN SODIUM 5000 UNITS: 5000 INJECTION INTRAVENOUS; SUBCUTANEOUS at 07:07

## 2019-08-05 RX ADMIN — HYDRALAZINE HYDROCHLORIDE 25 MG: 25 TABLET, FILM COATED ORAL at 14:44

## 2019-08-05 RX ADMIN — GEMFIBROZIL 600 MG: 600 TABLET ORAL at 17:27

## 2019-08-05 RX ADMIN — INSULIN LISPRO 1 UNITS: 100 INJECTION, SOLUTION INTRAVENOUS; SUBCUTANEOUS at 22:55

## 2019-08-05 RX ADMIN — SODIUM CHLORIDE, PRESERVATIVE FREE 10 ML: 5 INJECTION INTRAVENOUS at 09:08

## 2019-08-05 RX ADMIN — Medication 1 TABLET: at 09:09

## 2019-08-05 RX ADMIN — EPOETIN ALFA-EPBX 10000 UNITS: 10000 INJECTION, SOLUTION INTRAVENOUS; SUBCUTANEOUS at 12:20

## 2019-08-05 RX ADMIN — HEPARIN SODIUM 5000 UNITS: 5000 INJECTION INTRAVENOUS; SUBCUTANEOUS at 14:44

## 2019-08-05 RX ADMIN — PRAVASTATIN SODIUM 40 MG: 40 TABLET ORAL at 20:09

## 2019-08-05 RX ADMIN — GABAPENTIN 300 MG: 300 CAPSULE ORAL at 20:09

## 2019-08-05 RX ADMIN — SEVELAMER CARBONATE 800 MG: 800 TABLET, FILM COATED ORAL at 09:08

## 2019-08-05 RX ADMIN — SEVELAMER CARBONATE 800 MG: 800 TABLET, FILM COATED ORAL at 14:45

## 2019-08-05 RX ADMIN — SEVELAMER CARBONATE 800 MG: 800 TABLET, FILM COATED ORAL at 17:27

## 2019-08-05 RX ADMIN — GABAPENTIN 300 MG: 300 CAPSULE ORAL at 14:45

## 2019-08-05 RX ADMIN — GEMFIBROZIL 600 MG: 600 TABLET ORAL at 07:07

## 2019-08-05 RX ADMIN — INSULIN GLARGINE 5 UNITS: 100 INJECTION, SOLUTION SUBCUTANEOUS at 22:56

## 2019-08-05 ASSESSMENT — ENCOUNTER SYMPTOMS
VOMITING: 0
BACK PAIN: 0
CHEST TIGHTNESS: 0
COLOR CHANGE: 0
DIARRHEA: 0
NAUSEA: 0
EYE PAIN: 0
PHOTOPHOBIA: 0
BLOOD IN STOOL: 0
WHEEZING: 0
COUGH: 0
CONSTIPATION: 0
SHORTNESS OF BREATH: 0
ABDOMINAL PAIN: 0

## 2019-08-05 ASSESSMENT — PAIN SCALES - GENERAL
PAINLEVEL_OUTOF10: 0

## 2019-08-05 NOTE — CONSULTS
Reason for Consult? hypoglycemia, DM type 2     Bertha Aguirre is a 71y.o. year old female admitted with sinus bradycardia. History of low BG. Denies recent low BG. Allergies: Tape [adhesive tape]; Amlodipine besylate; and Terramycin [oxytetracycline]    HgBA1c:    Lab Results   Component Value Date    LABA1C 4.9 08/03/2019   eAg 94 mg/dl  This patient's last creatinine was   Recent Labs     08/05/19  0400   CREATININE 6.4*        Recent Blood sugars have been   Lab Results   Component Value Date    POCGLU 94 08/05/2019    POCGLU 100 08/05/2019    POCGLU 117 08/04/2019    POCGLU 101 08/04/2019    POCGLU 92 08/04/2019    POCGLU 74 08/04/2019    POCGLU 118 08/03/2019    POCGLU 122 08/03/2019     Lab Results   Component Value Date    GLUCOSE 96 08/05/2019    GLUCOSE 81 08/04/2019    GLUCOSE 84 08/03/2019          Bertha Aguirre  has a past medical history of Abscess, perineum, Chronic kidney disease, Diabetes 1.5, managed as type 1 ([address] Diabetes mellitus ([facility] [address] Gout, Hemodialysis patient ([facility] [address] Hx of pancreatitis, Hyperlipidemia, Hypertension, Macular degeneration, Open wound of genital labia, and Proteinuria. Diabetes Self Management education provided:  · Insulin administration: Patient states that she self injects her own insulin stating \"Dr Lin Chacon told me not to take it if my BG is less that 150\"    · Glucose Monitoring:DiscussedBG targets and when to notify family/healthcare team.  States that daughter lives nearby. · Meal planning: Reviewed importance of eating three meals per day and plate method for consistent carb intake. Verbalized understanding. · Hypoglycemia: Reviewed symptoms, prevention and treatment. Verbalized understanding. Patient states \"keep juice and gel ready\"   States that daughter has been instructed on low BG symptoms, prevention and treatment. · Hyperglycemia:  Reviewed symptoms, prevention and treatment. Verbalized understanding.   Printed information Diabetes,  contact

## 2019-08-05 NOTE — CONSULTS
Electrophysiology Consult Note      Reason for consultation: Mobitz Type 1     Chief complaint: slow HR     Referring physician:  Dr Jeaneth Zhao        Primary care physician: Kim Mendieta CNP        History of Present Illness:     Josefina Jacinto is a 71year old female who presented to the Emergency Room with complaints of a slow HR. She reports that the symptoms started the day before coming to the hospital (8/2/2019). She states that when she woke up she felt unsteady on her feet. She denies palpitations, shortness of breath, edema, dizziness, or syncope. She reports that she called her daughter to bring her to the Emergency Room for further evaluation. In the ER she was found to have a HR in the 40's. Her Coreg is on hold at this time. She has a past medical history of HTN, HLD, CKD on HD.      She feels good now and no further symptoms  Patient denies fever or chills      Pastmedical history:   Past Medical History:   Diagnosis Date    Abscess, perineum 1/6/2016    Chronic kidney disease     CKD stage 4 - for renal bx 1/8/2019    Diabetes 1.5, managed as type 1 ([address] 1/6/2016    Diabetes mellitus ([address]     20+ yrs    Gout     on allopurinal    Hemodialysis patient ([address]     per notes per Dr Silas Wells- pt was on HD for 2 months in 2011 due to ATN/ per pt on 6/24/2019\"tried to do dialysis 2 weeks ago and did not work- could not finish it so now putting in dialysis cath ( placement) 6/25/2019    Hx of pancreatitis     2011    Hyperlipidemia     Hypertension     Macular degeneration     \"right eyes- have to use magnifying glass\"    Open wound of genital labia     Proteinuria        Surgical history :   Past Surgical History:   Procedure Laterality Date    AV FISTULA CREATION Right 03/2019    \"done at [facility]\"    EYE SURGERY      \"laser surgery left eye and radiation in my right eye and surgery both eyes for cataract [id number]\"   Radha Perez [address] Review of Systems:   Review of Systems   Constitutional: Positive for fatigue. Negative for activity change, chills and fever. HENT: Negative for congestion, ear pain and tinnitus. Eyes: Negative for photophobia, pain and visual disturbance. Respiratory: Negative for cough, chest tightness, shortness of breath and wheezing. Cardiovascular: Negative for chest pain, palpitations and leg swelling. Gastrointestinal: Negative for abdominal pain, blood in stool, constipation, diarrhea, nausea and vomiting. Endocrine: Negative for cold intolerance and heat intolerance. Genitourinary: Negative for dysuria, flank pain and hematuria. Musculoskeletal: Negative for arthralgias, back pain, myalgias and neck stiffness. Skin: Negative for color change and rash. Allergic/Immunologic: Negative for food allergies. Neurological: Negative for dizziness, light-headedness, numbness and headaches. Unsteady on feet   Hematological: Does not bruise/bleed easily. Psychiatric/Behavioral: Negative for agitation, behavioral problems and confusion. Examination:    /71   Pulse 75   Temp 97.7 °F (36.5 °C)   Resp 18   Ht 4' 11\" (1.499 m)   Wt 135 lb 9.3 oz (61.5 kg)   LMP 01/07/2009   SpO2 97%   BMI 27.38 kg/m²    Wt Readings from Last 3 Encounters:   08/05/19 135 lb 9.3 oz (61.5 kg)   06/25/19 146 lb (66.2 kg)   01/07/19 143 lb (64.9 kg)     Body mass index is 27.38 kg/m². Physical Exam   Constitutional: She is oriented to person, place, and time. She appears well-developed and well-nourished. HENT:   Head: Normocephalic and atraumatic. Eyes: Pupils are equal, round, and reactive to light. Conjunctivae and EOM are normal. Right eye exhibits no discharge. Neck: Normal range of motion. No JVD present. No thyromegaly present. Cardiovascular: Normal rate, regular rhythm and normal heart sounds. Exam reveals no friction rub. No murmur heard.   Pulmonary/Chest: Effort normal

## 2019-08-06 VITALS
BODY MASS INDEX: 27.33 KG/M2 | WEIGHT: 135.58 LBS | SYSTOLIC BLOOD PRESSURE: 120 MMHG | TEMPERATURE: 98.2 F | HEART RATE: 66 BPM | OXYGEN SATURATION: 97 % | HEIGHT: 59 IN | DIASTOLIC BLOOD PRESSURE: 52 MMHG | RESPIRATION RATE: 15 BRPM

## 2019-08-06 LAB
ANION GAP SERPL CALCULATED.3IONS-SCNC: 12 MMOL/L (ref 4–16)
BASOPHILS ABSOLUTE: 0.1 K/CU MM
BASOPHILS RELATIVE PERCENT: 0.8 % (ref 0–1)
BUN BLDV-MCNC: 19 MG/DL (ref 6–23)
CALCIUM SERPL-MCNC: 8.6 MG/DL (ref 8.3–10.6)
CHLORIDE BLD-SCNC: 100 MMOL/L (ref 99–110)
CO2: 26 MMOL/L (ref 21–32)
CREAT SERPL-MCNC: 4.5 MG/DL (ref 0.6–1.1)
DIFFERENTIAL TYPE: ABNORMAL
EOSINOPHILS ABSOLUTE: 0.1 K/CU MM
EOSINOPHILS RELATIVE PERCENT: 1.4 % (ref 0–3)
GFR AFRICAN AMERICAN: 12 ML/MIN/1.73M2
GFR NON-AFRICAN AMERICAN: 10 ML/MIN/1.73M2
GLUCOSE BLD-MCNC: 118 MG/DL (ref 70–99)
GLUCOSE BLD-MCNC: 50 MG/DL (ref 70–99)
GLUCOSE BLD-MCNC: 95 MG/DL (ref 70–99)
HCT VFR BLD CALC: 30.2 % (ref 37–47)
HEMOGLOBIN: 8.9 GM/DL (ref 12.5–16)
IMMATURE NEUTROPHIL %: 0.6 % (ref 0–0.43)
LYMPHOCYTES ABSOLUTE: 1.7 K/CU MM
LYMPHOCYTES RELATIVE PERCENT: 17.8 % (ref 24–44)
MAGNESIUM: 2.1 MG/DL (ref 1.8–2.4)
MCH RBC QN AUTO: 31.1 PG (ref 27–31)
MCHC RBC AUTO-ENTMCNC: 29.5 % (ref 32–36)
MCV RBC AUTO: 105.6 FL (ref 78–100)
MONOCYTES ABSOLUTE: 1.2 K/CU MM
MONOCYTES RELATIVE PERCENT: 12.1 % (ref 0–4)
NUCLEATED RBC %: 0.2 %
PDW BLD-RTO: 15.9 % (ref 11.7–14.9)
PLATELET # BLD: 337 K/CU MM (ref 140–440)
PMV BLD AUTO: 9.6 FL (ref 7.5–11.1)
POTASSIUM SERPL-SCNC: 3.9 MMOL/L (ref 3.5–5.1)
RBC # BLD: 2.86 M/CU MM (ref 4.2–5.4)
SEGMENTED NEUTROPHILS ABSOLUTE COUNT: 6.5 K/CU MM
SEGMENTED NEUTROPHILS RELATIVE PERCENT: 67.3 % (ref 36–66)
SODIUM BLD-SCNC: 138 MMOL/L (ref 135–145)
TOTAL IMMATURE NEUTOROPHIL: 0.06 K/CU MM
TOTAL NUCLEATED RBC: 0 K/CU MM
WBC # BLD: 9.7 K/CU MM (ref 4–10.5)

## 2019-08-06 PROCEDURE — 85025 COMPLETE CBC W/AUTO DIFF WBC: CPT

## 2019-08-06 PROCEDURE — 6370000000 HC RX 637 (ALT 250 FOR IP): Performed by: INTERNAL MEDICINE

## 2019-08-06 PROCEDURE — 6360000002 HC RX W HCPCS: Performed by: INTERNAL MEDICINE

## 2019-08-06 PROCEDURE — 83735 ASSAY OF MAGNESIUM: CPT

## 2019-08-06 PROCEDURE — 80048 BASIC METABOLIC PNL TOTAL CA: CPT

## 2019-08-06 PROCEDURE — 36415 COLL VENOUS BLD VENIPUNCTURE: CPT

## 2019-08-06 PROCEDURE — 2580000003 HC RX 258: Performed by: INTERNAL MEDICINE

## 2019-08-06 PROCEDURE — 82962 GLUCOSE BLOOD TEST: CPT

## 2019-08-06 RX ORDER — FUROSEMIDE 40 MG/1
80 TABLET ORAL DAILY
Status: DISCONTINUED | OUTPATIENT
Start: 2019-08-06 | End: 2019-08-06 | Stop reason: HOSPADM

## 2019-08-06 RX ADMIN — HYDRALAZINE HYDROCHLORIDE 25 MG: 25 TABLET, FILM COATED ORAL at 14:47

## 2019-08-06 RX ADMIN — SEVELAMER CARBONATE 800 MG: 800 TABLET, FILM COATED ORAL at 08:25

## 2019-08-06 RX ADMIN — FUROSEMIDE 80 MG: 40 TABLET ORAL at 08:40

## 2019-08-06 RX ADMIN — HEPARIN SODIUM 5000 UNITS: 5000 INJECTION INTRAVENOUS; SUBCUTANEOUS at 14:47

## 2019-08-06 RX ADMIN — SODIUM CHLORIDE, PRESERVATIVE FREE 10 ML: 5 INJECTION INTRAVENOUS at 08:28

## 2019-08-06 RX ADMIN — GEMFIBROZIL 600 MG: 600 TABLET ORAL at 07:05

## 2019-08-06 RX ADMIN — HEPARIN SODIUM 5000 UNITS: 5000 INJECTION INTRAVENOUS; SUBCUTANEOUS at 07:06

## 2019-08-06 RX ADMIN — Medication 1 TABLET: at 08:25

## 2019-08-06 RX ADMIN — GABAPENTIN 300 MG: 300 CAPSULE ORAL at 14:46

## 2019-08-06 RX ADMIN — SEVELAMER CARBONATE 800 MG: 800 TABLET, FILM COATED ORAL at 11:34

## 2019-08-06 RX ADMIN — GABAPENTIN 300 MG: 300 CAPSULE ORAL at 08:25

## 2019-08-06 RX ADMIN — HYDRALAZINE HYDROCHLORIDE 25 MG: 25 TABLET, FILM COATED ORAL at 07:05

## 2019-08-06 RX ADMIN — ALLOPURINOL 100 MG: 100 TABLET ORAL at 08:25

## 2019-08-06 RX ADMIN — LOPERAMIDE HYDROCHLORIDE 2 MG: 2 CAPSULE ORAL at 08:40

## 2019-08-06 NOTE — PROGRESS NOTES
- doing ok on room air  - no bradycardia    A&P:  - Bradycardia: resolved, no coreg on dc, no pacemaker needed per card eval, ef 55-60%, mild lvh  - ESRD on HD MF; planning HD today, remove left tunnelled cath today (IR consulted for that), stop oral bicarb on dc, resume lasix 80mg po daily//tunnelled hd cath removed on 8/5/19, AVF being used now for HD  - Anemia of ESRD: give venofer and epogen on HD  - HTN:give hydralazine 25mg po tid, coreg on hold due to bradycardia    Pt to resume her HD as outpt on Friday  Ok to Dc per renal   
Hospitalist Progress Note      PCP: Dorothea Arango CNP    Date of Admission: 8/3/2019    ASSESSMENT AND PLAN    - Bradycardia. Cardiology seeing. They spoke to EP and it is Morbitz I. No pacemaker needed. Coreg held and need to sort out with cardiology if to completely hold at d/c or decrease the dose. TSH normal. F/u on echo    - ESRD. HD per nephrology    - HTN. C/w home meds    - NSTEMI. Likely type II    - DM2. C/w SSI. Long acting insulin. F/u A1c      SUBJECTIVE: Patient seen and examined. No acute events. HR in 70s while I was seeing the patient. Feels well. OBJECTIVE:   GEN: NAD  CVS: RRR. S1, S2, No m/r/g  RESP: CTAB  ABD: Soft, NT, ND, +BS  EXT: No edema. 2+ DP  NEURO: Nonfocal   Allergies  Tape [adhesive tape]; Amlodipine besylate; and Terramycin [oxytetracycline]    Medications      Scheduled Meds:   hydrALAZINE  25 mg Oral 2 times per day    folbee plus  1 tablet Oral Daily    allopurinol  100 mg Oral Daily    gabapentin  300 mg Oral TID    gemfibrozil  600 mg Oral BID AC    insulin glargine  5 Units Subcutaneous Nightly    pravastatin  40 mg Oral Nightly    sodium bicarbonate  650 mg Oral TID    sodium chloride flush  10 mL Intravenous 2 times per day    heparin (porcine)  5,000 Units Subcutaneous 3 times per day    insulin lispro  0-6 Units Subcutaneous TID WC    insulin lispro  0-3 Units Subcutaneous Nightly    sevelamer  800 mg Oral TID       Infusions:   dextrose         PRN Meds:  sodium chloride flush, magnesium hydroxide, ondansetron, glucose, dextrose, glucagon (rDNA), dextrose    Vitals    TEMPERATURE:  Current - Temp: 98.7 °F (37.1 °C);  Max - Temp  Av.4 °F (36.9 °C)  Min: 97.8 °F (36.6 °C)  Max: 98.9 °F (37.2 °C)  RESPIRATIONS RANGE: Resp  Av.2  Min: 17  Max: 26  PULSE RANGE: Pulse  Av.7  Min: 45  Max: 84  BLOOD PRESSURE RANGE:  Systolic (06TGL), FAF:186 , Min:107 , EHC:782   ; Diastolic (85JPQ), NEDA:63, Min:42, Max:74    PULSE OXIMETRY RANGE:
Patient tolerated HD treatment well, 1 L removed. Patient educated on access care, patient verbalized understanding. Patient given retacrit and venofer for anemia. R AVF able to be cannulated both sites, +thrill/bruit. HD CVC clean, dry, and intact. Patient sent to IR for HD CVC removal. Report given to LEONIDAS Stafford.
Progress Note( Dr. Lin Chacon)  8/5/2019  Subjective:   Admit Date: 8/3/2019  PCP: Dorothea Arango CNP    Admitted For : Dizziness and bradycardia patient is also known diabetic    Consulted For: Better control of blood glucose    Interval History: Still feels dizzy able to stand much and walk without any help    Denies any chest pains,   Denies SOB . Denies nausea or vomiting. No new bowel or bladder symptoms.        Intake/Output Summary (Last 24 hours) at 8/5/2019 2234  Last data filed at 8/5/2019 1307  Gross per 24 hour   Intake 300 ml   Output 1301 ml   Net -1001 ml       DATA    CBC:   Recent Labs     08/03/19  1135 08/04/19  0143 08/05/19  0400   WBC 8.4 9.9 10.5   HGB 6.9  HGB CALLED TO YURY DE LA CRUZ RN ON 08/03/2019 @ 1211 BY VINCE MLS   RESULTS READ BACK  * 8.3* 8.7*    287 311    CMP:  Recent Labs     08/03/19  1135 08/04/19  0143 08/05/19  0400    137 139   K 4.1 4.1 4.1    102 103   CO2 24 24 22   BUN 18 23 32*   CREATININE 4.1* 5.1* 6.4*   CALCIUM 8.2* 8.2* 8.3   PROT 4.8*  --   --    LABALBU 2.9*  --   --    BILITOT 0.1  --   --    ALKPHOS 101  --   --    AST 12*  --   --    ALT 7*  --   --      Lipids: No results found for: CHOL, HDL, TRIG  Glucose:  Recent Labs     08/05/19  1449 08/05/19  1633 08/05/19 2014   POCGLU 94 94 214*     [id number]:  Lab Results   Component Value Date    LABA1C 4.9 08/03/2019     High Sensitivity TSH:   Lab Results   Component Value Date    TSHHS 1.780 08/03/2019     Free T3: No results found for: FT3  Free T4:No results found for: T4FREE    Xr Chest Standard (2 Vw)    Result Date: 8/3/2019  EXAMINATION: TWO XRAY VIEWS OF THE CHEST 8/3/2019 4:30 pm COMPARISON: Chest x-ray 08/03/2019 HISTORY: ORDERING SYSTEM PROVIDED HISTORY: abnormal portable CXR, ?atelectasis vs. infiltrate TECHNOLOGIST PROVIDED HISTORY: Reason for exam:->abnormal portable CXR, ?atelectasis vs. infiltrate Reason for Exam: abnormal portable CXR, ?atelectasis vs.
Non-toxic appearance. HENT:  Normocephalic, Atraumatic, Bilateral external ears normal, Oropharynx moist, No oral exudates, Nose normal. Neck- Normal range of motion, No tenderness, Supple, No stridor. Eyes:  PERRL, EOMI, Conjunctiva normal, No discharge. Respiratory:  Normal breath sounds, No respiratory distress, No wheezing, No chest tenderness. ,no use of accessory muscles, diaphragm movement is normal  Cardiovascular: (PMI) apex non displaced,no lifts no thrills, no s3,no s4, Normal heart rate, Normal rhythm, No murmurs, No rubs, No gallops. Carotid arteries pulse and amplitude are normal no bruit, no abdominal bruit noted ( normal abdominal aorta ausculation), femoral arteries pulse and amplitude are normal no bruit, pedal pulses are normal  GI:  Bowel sounds normal, Soft, No tenderness, No masses, No pulsatile masses. : External genitalia appear normal, No masses or lesions. No discharge. No CVA tenderness. Musculoskeletal:  Intact distal pulses, No edema, No tenderness, No cyanosis, No clubbing. Good range of motion in all major joints. No tenderness to palpation or major deformities noted. Back- No tenderness. Integument:  Warm, Dry, No erythema, No rash. Lymphatic:  No lymphadenopathy noted. Neurologic:  Alert & oriented x 3, Normal motor function, Normal sensory function, No focal deficits noted.    Psychiatric:  Affect normal, Judgment normal, Mood normal.     Medications:    hydrALAZINE  25 mg Oral 2 times per day    folbee plus  1 tablet Oral Daily    allopurinol  100 mg Oral Daily    gabapentin  300 mg Oral TID    gemfibrozil  600 mg Oral BID AC    insulin glargine  5 Units Subcutaneous Nightly    pravastatin  40 mg Oral Nightly    sodium bicarbonate  650 mg Oral TID    sodium chloride flush  10 mL Intravenous 2 times per day    heparin (porcine)  5,000 Units Subcutaneous 3 times per day    insulin lispro  0-6 Units Subcutaneous TID WC    insulin lispro  0-3 Units
services?: Yes  Family / Caregiver Present: No  Follows Commands: Within Functional Limits  Pain Screening  Patient Currently in Pain: Denies  Vital Signs  Patient Currently in Pain: Denies       Orientation  Orientation  Overall Orientation Status: Within Functional Limits  Social/Functional History  Social/Functional History  Lives With: Daughter  Type of Home: House  Home Layout: Two level, Bed/Bath upstairs, Able to Live on Main level with bedroom/bathroom  Home Access: Level entry  Bathroom Shower/Tub: Tub/Shower unit  Bathroom Toilet: Standard  Home Equipment: Aldona Timmy, Rolling walker  ADL Assistance: Independent  Ambulation Assistance: Independent(mod I with quad cane)  Transfer Assistance: Independent  Active : No  Patient's  Info: daughter   Cognition   Cognition  Overall Cognitive Status: WNL    Objective             Strength RLE  Comment: knee extension: 4+/5, ankle DF: 4+/5  Strength LLE  Comment: knee extension: 4+/5, ankle DF: 4+/5     Sensation  Overall Sensation Status: WFL  Bed mobility  Rolling to Left: Independent  Rolling to Right: Independent  Supine to Sit: Independent  Sit to Supine: Independent  Transfers  Sit to Stand: Independent  Stand to sit:  Independent  Ambulation  Ambulation?: Yes  More Ambulation?: Yes  Ambulation 1  Surface: level tile  Device: Single point cane  Assistance: Contact guard assistance;Stand by assistance  Quality of Gait: decreased gait speed, decreased step length bilaterally, intermittent unsteady gait  Distance: 100 feet  Comments: therapist recommended front wheeled walker at this time  Ambulation 2  Surface - 2: level tile  Device 2: [address] [facility] 2: Stand by assistance;Supervision  Quality of Gait 2: decreased gait speed, decreased step length bilaterally  Distance: 200 feet + 200 feet  Comments: with initial verbal cues for directions in order to navigate hallway  Stairs/Curb  Stairs?: Yes  Stairs  # Steps : 14  Rails: Right

## 2019-08-06 NOTE — DISCHARGE SUMMARY
Discharge Summary    Patient ID   Savanah Saenz   1955  [id number]    Primary Care:   Honey Guerin CNP    Admit date: 8/3/2019   Discharge date: 8/6/2019    Medical Record number: [id number]   Admitting Physician: Yousif Miller MD   Discharge Physician: Kiarra Alvarez MD    Consultants: cardiology, pulmonary/intensive care, nephrology and endocrinology    Procedures: none    Discharge Diagnoses:      Patient Active Problem List   Diagnosis Code    Diabetes 1.5, managed as type 1 ([address] E13.9    Sinus bradycardia R00.1    Renal insufficiency N28.9         Hospital Course: Ms. Jasmeet Celaya is a 58-year-old female presented to the emergency department due to not feeling well fatigued with a low heart rate. The rest of her hospital stay is listed below. Bradycardia. Cardiology seeing. They consulted EP and possibly will need ichemic work up. They spoke to EP on Sunday 8/4 and it is likely Morbitz I. No pacemaker needed. Coreg held and likely stop at d/c. TSH normal. F/u on echo     - ESRD. HD per nephrology     - HTN. C/w home meds     - NSTEMI. Likely type II. Possible ischemic work up while here     - DM2. C/w SSI. Long acting insulin. F/u A1c    She was seen and evaluated by cardiology and improved significantly with medical management and change of medications at this time has been cleared by cardiology to be discharged home she is to follow-up with her primary care provider within 1 week and also with cardiology in the outpatient setting.   Physical Exam:   /80   Pulse 66   Temp 98.2 °F (36.8 °C) (Oral)   Resp 15   Ht 4' 11\" (1.499 m)   Wt 135 lb 9.3 oz (61.5 kg)   LMP 01/07/2009   SpO2 97%   BMI 27.38 kg/m²   Neck: no JVD  Lungs: equal BS, clear   CV: RRR, normal S1S2, no significant murmur  Abdomen: soft, nontender, normally active BS, no masses or tenderness  Extremities: no edema or cords  Neurologic: alert, oriented, no focal CN or motor

## 2019-08-15 ENCOUNTER — APPOINTMENT (OUTPATIENT)
Dept: GENERAL RADIOLOGY | Age: 64
DRG: 280 | End: 2019-08-15
Attending: INTERNAL MEDICINE
Payer: MEDICARE

## 2019-08-15 ENCOUNTER — HOSPITAL ENCOUNTER (INPATIENT)
Age: 64
LOS: 7 days | Discharge: ANOTHER HOSPITAL | DRG: 280 | End: 2019-08-22
Attending: INTERNAL MEDICINE | Admitting: INTERNAL MEDICINE
Payer: MEDICARE

## 2019-08-15 PROBLEM — J18.9 PNEUMONIA: Status: ACTIVE | Noted: 2019-08-15

## 2019-08-15 PROBLEM — D72.829 LEUKOCYTOSIS: Status: ACTIVE | Noted: 2019-08-15

## 2019-08-15 LAB
ADENOVIRUS DETECTION BY PCR: NOT DETECTED
ANION GAP SERPL CALCULATED.3IONS-SCNC: 12 MMOL/L (ref 4–16)
ANISOCYTOSIS: ABNORMAL
BASOPHILS ABSOLUTE: 0 K/CU MM
BASOPHILS RELATIVE PERCENT: 0.2 % (ref 0–1)
BORDETELLA PERTUSSIS PCR: NOT DETECTED
BUN BLDV-MCNC: 32 MG/DL (ref 6–23)
CALCIUM SERPL-MCNC: 8.1 MG/DL (ref 8.3–10.6)
CHLAMYDOPHILA PNEUMONIA PCR: NOT DETECTED
CHLORIDE BLD-SCNC: 94 MMOL/L (ref 99–110)
CLOSTRIDIUM DIFFICILE, PCR: ABNORMAL
CLOSTRIDIUM DIFFICILE, PCR: ABNORMAL
CO2: 22 MMOL/L (ref 21–32)
CORONAVIRUS 229E PCR: NOT DETECTED
CORONAVIRUS HKU1 PCR: NOT DETECTED
CORONAVIRUS NL63 PCR: NOT DETECTED
CORONAVIRUS OC43 PCR: NOT DETECTED
CREAT SERPL-MCNC: 5.7 MG/DL (ref 0.6–1.1)
DIFFERENTIAL TYPE: ABNORMAL
DOSE AMOUNT: NORMAL
DOSE TIME: NORMAL
EOSINOPHILS ABSOLUTE: 0 K/CU MM
EOSINOPHILS RELATIVE PERCENT: 0.1 % (ref 0–3)
GFR AFRICAN AMERICAN: 9 ML/MIN/1.73M2
GFR NON-AFRICAN AMERICAN: 7 ML/MIN/1.73M2
GLUCOSE BLD-MCNC: 109 MG/DL (ref 70–99)
GLUCOSE BLD-MCNC: 136 MG/DL (ref 70–99)
GLUCOSE BLD-MCNC: 86 MG/DL (ref 70–99)
GLUCOSE BLD-MCNC: 87 MG/DL (ref 70–99)
GLUCOSE BLD-MCNC: 92 MG/DL (ref 70–99)
GLUCOSE BLD-MCNC: 98 MG/DL (ref 70–99)
HCT VFR BLD CALC: 26 % (ref 37–47)
HEMOGLOBIN: 8.2 GM/DL (ref 12.5–16)
HUMAN METAPNEUMOVIRUS PCR: NOT DETECTED
IMMATURE NEUTROPHIL %: 0.9 % (ref 0–0.43)
INFLUENZA A BY PCR: NOT DETECTED
INFLUENZA A H1 (2009) PCR: NOT DETECTED
INFLUENZA A H1 PANDEMIC PCR: NOT DETECTED
INFLUENZA A H3 PCR: NOT DETECTED
INFLUENZA B BY PCR: NOT DETECTED
LACTATE: 0.6 MMOL/L (ref 0.4–2)
LYMPHOCYTES ABSOLUTE: 0.8 K/CU MM
LYMPHOCYTES RELATIVE PERCENT: 3.7 % (ref 24–44)
MACROCYTES: ABNORMAL
MCH RBC QN AUTO: 32 PG (ref 27–31)
MCHC RBC AUTO-ENTMCNC: 31.5 % (ref 32–36)
MCV RBC AUTO: 101.6 FL (ref 78–100)
MONOCYTES ABSOLUTE: 0.9 K/CU MM
MONOCYTES RELATIVE PERCENT: 4.3 % (ref 0–4)
MYCOPLASMA PNEUMONIAE PCR: NOT DETECTED
PARAINFLUENZA 1 PCR: NOT DETECTED
PARAINFLUENZA 2 PCR: NOT DETECTED
PARAINFLUENZA 3 PCR: NOT DETECTED
PARAINFLUENZA 4 PCR: NOT DETECTED
PDW BLD-RTO: 15.9 % (ref 11.7–14.9)
PLATELET # BLD: 246 K/CU MM (ref 140–440)
PMV BLD AUTO: 10 FL (ref 7.5–11.1)
POLYCHROMASIA: ABNORMAL
POTASSIUM SERPL-SCNC: 3.3 MMOL/L (ref 3.5–5.1)
PROCALCITONIN: 7.02
RBC # BLD: 2.56 M/CU MM (ref 4.2–5.4)
RHINOVIRUS ENTEROVIRUS PCR: NOT DETECTED
RSV PCR: NOT DETECTED
SEGMENTED NEUTROPHILS ABSOLUTE COUNT: 19.7 K/CU MM
SEGMENTED NEUTROPHILS RELATIVE PERCENT: 90.8 % (ref 36–66)
SODIUM BLD-SCNC: 128 MMOL/L (ref 135–145)
TOTAL IMMATURE NEUTOROPHIL: 0.19 K/CU MM
TROPONIN T: 0.29 NG/ML
TROPONIN T: 0.29 NG/ML
TROPONIN T: 0.31 NG/ML
VANCOMYCIN RANDOM: 20.2 UG/ML
VANCOMYCIN RANDOM: NORMAL UG/ML
WBC # BLD: 21.6 K/CU MM (ref 4–10.5)

## 2019-08-15 PROCEDURE — 93005 ELECTROCARDIOGRAM TRACING: CPT | Performed by: INTERNAL MEDICINE

## 2019-08-15 PROCEDURE — 36415 COLL VENOUS BLD VENIPUNCTURE: CPT

## 2019-08-15 PROCEDURE — 80048 BASIC METABOLIC PNL TOTAL CA: CPT

## 2019-08-15 PROCEDURE — 6370000000 HC RX 637 (ALT 250 FOR IP): Performed by: INTERNAL MEDICINE

## 2019-08-15 PROCEDURE — 82962 GLUCOSE BLOOD TEST: CPT

## 2019-08-15 PROCEDURE — 85025 COMPLETE CBC W/AUTO DIFF WBC: CPT

## 2019-08-15 PROCEDURE — 93010 ELECTROCARDIOGRAM REPORT: CPT | Performed by: INTERNAL MEDICINE

## 2019-08-15 PROCEDURE — 87798 DETECT AGENT NOS DNA AMP: CPT

## 2019-08-15 PROCEDURE — 2140000000 HC CCU INTERMEDIATE R&B

## 2019-08-15 PROCEDURE — 87633 RESP VIRUS 12-25 TARGETS: CPT

## 2019-08-15 PROCEDURE — 87040 BLOOD CULTURE FOR BACTERIA: CPT

## 2019-08-15 PROCEDURE — 2580000003 HC RX 258: Performed by: INTERNAL MEDICINE

## 2019-08-15 PROCEDURE — 87150 DNA/RNA AMPLIFIED PROBE: CPT

## 2019-08-15 PROCEDURE — 84145 PROCALCITONIN (PCT): CPT

## 2019-08-15 PROCEDURE — 6360000002 HC RX W HCPCS: Performed by: INTERNAL MEDICINE

## 2019-08-15 PROCEDURE — 94761 N-INVAS EAR/PLS OXIMETRY MLT: CPT

## 2019-08-15 PROCEDURE — 87324 CLOSTRIDIUM AG IA: CPT

## 2019-08-15 PROCEDURE — 83605 ASSAY OF LACTIC ACID: CPT

## 2019-08-15 PROCEDURE — APPNB60 APP NON BILLABLE TIME 46-60 MINS: Performed by: NURSE PRACTITIONER

## 2019-08-15 PROCEDURE — 87486 CHLMYD PNEUM DNA AMP PROBE: CPT

## 2019-08-15 PROCEDURE — 99218 PR INITIAL OBSERVATION CARE/DAY 30 MINUTES: CPT | Performed by: INTERNAL MEDICINE

## 2019-08-15 PROCEDURE — 71046 X-RAY EXAM CHEST 2 VIEWS: CPT

## 2019-08-15 PROCEDURE — 80202 ASSAY OF VANCOMYCIN: CPT

## 2019-08-15 PROCEDURE — 6370000000 HC RX 637 (ALT 250 FOR IP): Performed by: HOSPITALIST

## 2019-08-15 PROCEDURE — 87186 SC STD MICRODIL/AGAR DIL: CPT

## 2019-08-15 PROCEDURE — 87581 M.PNEUMON DNA AMP PROBE: CPT

## 2019-08-15 PROCEDURE — 84484 ASSAY OF TROPONIN QUANT: CPT

## 2019-08-15 RX ORDER — SEVELAMER CARBONATE 800 MG/1
800 TABLET, FILM COATED ORAL
Status: DISCONTINUED | OUTPATIENT
Start: 2019-08-15 | End: 2019-08-18

## 2019-08-15 RX ORDER — FUROSEMIDE 40 MG/1
80 TABLET ORAL DAILY
Status: DISCONTINUED | OUTPATIENT
Start: 2019-08-16 | End: 2019-08-23 | Stop reason: HOSPADM

## 2019-08-15 RX ORDER — FUROSEMIDE 10 MG/ML
80 INJECTION INTRAMUSCULAR; INTRAVENOUS ONCE
Status: DISCONTINUED | OUTPATIENT
Start: 2019-08-15 | End: 2019-08-15

## 2019-08-15 RX ORDER — HEPARIN SODIUM 5000 [USP'U]/ML
5000 INJECTION, SOLUTION INTRAVENOUS; SUBCUTANEOUS EVERY 8 HOURS
Status: DISCONTINUED | OUTPATIENT
Start: 2019-08-15 | End: 2019-08-23 | Stop reason: HOSPADM

## 2019-08-15 RX ORDER — FUROSEMIDE 40 MG/1
80 TABLET ORAL DAILY
Status: DISCONTINUED | OUTPATIENT
Start: 2019-08-15 | End: 2019-08-15

## 2019-08-15 RX ORDER — DEXTROSE MONOHYDRATE 50 MG/ML
100 INJECTION, SOLUTION INTRAVENOUS PRN
Status: DISCONTINUED | OUTPATIENT
Start: 2019-08-15 | End: 2019-08-23 | Stop reason: HOSPADM

## 2019-08-15 RX ORDER — FUROSEMIDE 10 MG/ML
80 INJECTION INTRAMUSCULAR; INTRAVENOUS ONCE
Status: COMPLETED | OUTPATIENT
Start: 2019-08-15 | End: 2019-08-15

## 2019-08-15 RX ORDER — POTASSIUM CHLORIDE 20 MEQ/1
40 TABLET, EXTENDED RELEASE ORAL ONCE
Status: COMPLETED | OUTPATIENT
Start: 2019-08-15 | End: 2019-08-15

## 2019-08-15 RX ORDER — ONDANSETRON 4 MG/1
4 TABLET, ORALLY DISINTEGRATING ORAL EVERY 8 HOURS PRN
Status: DISCONTINUED | OUTPATIENT
Start: 2019-08-15 | End: 2019-08-23 | Stop reason: HOSPADM

## 2019-08-15 RX ORDER — IPRATROPIUM BROMIDE AND ALBUTEROL SULFATE 2.5; .5 MG/3ML; MG/3ML
1 SOLUTION RESPIRATORY (INHALATION) EVERY 4 HOURS PRN
Status: DISCONTINUED | OUTPATIENT
Start: 2019-08-15 | End: 2019-08-23 | Stop reason: HOSPADM

## 2019-08-15 RX ORDER — HYDRALAZINE HYDROCHLORIDE 50 MG/1
50 TABLET, FILM COATED ORAL 3 TIMES DAILY
Status: DISCONTINUED | OUTPATIENT
Start: 2019-08-15 | End: 2019-08-23 | Stop reason: HOSPADM

## 2019-08-15 RX ORDER — ALLOPURINOL 100 MG/1
100 TABLET ORAL DAILY
Status: DISCONTINUED | OUTPATIENT
Start: 2019-08-15 | End: 2019-08-23 | Stop reason: HOSPADM

## 2019-08-15 RX ORDER — GEMFIBROZIL 600 MG/1
600 TABLET, FILM COATED ORAL
Status: DISCONTINUED | OUTPATIENT
Start: 2019-08-15 | End: 2019-08-23 | Stop reason: HOSPADM

## 2019-08-15 RX ORDER — ACETAMINOPHEN 325 MG/1
650 TABLET ORAL EVERY 4 HOURS PRN
Status: DISCONTINUED | OUTPATIENT
Start: 2019-08-15 | End: 2019-08-23 | Stop reason: HOSPADM

## 2019-08-15 RX ORDER — DEXTROSE MONOHYDRATE 25 G/50ML
12.5 INJECTION, SOLUTION INTRAVENOUS PRN
Status: DISCONTINUED | OUTPATIENT
Start: 2019-08-15 | End: 2019-08-23 | Stop reason: HOSPADM

## 2019-08-15 RX ORDER — NICOTINE POLACRILEX 4 MG
15 LOZENGE BUCCAL PRN
Status: DISCONTINUED | OUTPATIENT
Start: 2019-08-15 | End: 2019-08-23 | Stop reason: HOSPADM

## 2019-08-15 RX ADMIN — CEFTRIAXONE 1 G: 1 INJECTION, POWDER, FOR SOLUTION INTRAMUSCULAR; INTRAVENOUS at 05:36

## 2019-08-15 RX ADMIN — Medication 250 MG: at 12:01

## 2019-08-15 RX ADMIN — DARBEPOETIN ALFA 100 MCG: 100 SOLUTION INTRAVENOUS; SUBCUTANEOUS at 12:02

## 2019-08-15 RX ADMIN — FUROSEMIDE 80 MG: 10 INJECTION, SOLUTION INTRAMUSCULAR; INTRAVENOUS at 10:00

## 2019-08-15 RX ADMIN — SEVELAMER CARBONATE 800 MG: 800 TABLET, FILM COATED ORAL at 09:59

## 2019-08-15 RX ADMIN — SEVELAMER CARBONATE 800 MG: 800 TABLET, FILM COATED ORAL at 18:01

## 2019-08-15 RX ADMIN — ALLOPURINOL 100 MG: 100 TABLET ORAL at 09:59

## 2019-08-15 RX ADMIN — HEPARIN SODIUM 5000 UNITS: 5000 INJECTION INTRAVENOUS; SUBCUTANEOUS at 20:34

## 2019-08-15 RX ADMIN — POTASSIUM CHLORIDE 40 MEQ: 20 TABLET, EXTENDED RELEASE ORAL at 09:58

## 2019-08-15 RX ADMIN — ACETAMINOPHEN 650 MG: 325 TABLET ORAL at 14:06

## 2019-08-15 RX ADMIN — FUROSEMIDE 80 MG: 10 INJECTION, SOLUTION INTRAMUSCULAR; INTRAVENOUS at 17:55

## 2019-08-15 RX ADMIN — HEPARIN SODIUM 5000 UNITS: 5000 INJECTION INTRAVENOUS; SUBCUTANEOUS at 05:36

## 2019-08-15 RX ADMIN — GEMFIBROZIL 600 MG: 600 TABLET ORAL at 17:55

## 2019-08-15 RX ADMIN — HYDRALAZINE HYDROCHLORIDE 50 MG: 50 TABLET, FILM COATED ORAL at 09:59

## 2019-08-15 RX ADMIN — HYDRALAZINE HYDROCHLORIDE 50 MG: 50 TABLET, FILM COATED ORAL at 18:00

## 2019-08-15 RX ADMIN — HEPARIN SODIUM 5000 UNITS: 5000 INJECTION INTRAVENOUS; SUBCUTANEOUS at 12:02

## 2019-08-15 RX ADMIN — SEVELAMER CARBONATE 800 MG: 800 TABLET, FILM COATED ORAL at 12:06

## 2019-08-15 RX ADMIN — GEMFIBROZIL 600 MG: 600 TABLET ORAL at 05:37

## 2019-08-15 RX ADMIN — Medication 250 MG: at 23:23

## 2019-08-15 RX ADMIN — Medication 250 MG: at 17:54

## 2019-08-15 ASSESSMENT — PAIN SCALES - GENERAL
PAINLEVEL_OUTOF10: 5
PAINLEVEL_OUTOF10: 0
PAINLEVEL_OUTOF10: 0

## 2019-08-15 NOTE — CONSULTS
age    Allergies   Allergen Reactions    Tape Alveria Cogan Tape] Itching and Rash     Paper tape OK      Amlodipine Besylate      Passed out     Terramycin [Oxytetracycline] Other (See Comments)     \"swelling of my face\"         gemfibrozil (LOPID) tablet 600 mg BID AC   allopurinol (ZYLOPRIM) tablet 100 mg Daily   ondansetron (ZOFRAN-ODT) disintegrating tablet 4 mg Q8H PRN   hydrALAZINE (APRESOLINE) tablet 50 mg TID   sevelamer (RENVELA) tablet 800 mg TID    heparin (porcine) injection 5,000 Units Q8H   cefTRIAXone (ROCEPHIN) 1 g in dextrose 5 % 50 mL IVPB Q24H   [START ON 8/16/2019] azithromycin (ZITHROMAX) 250 mg in dextrose 5 % 250 mL IVPB Q24H   ipratropium-albuterol (DUONEB) nebulizer solution 1 ampule Q4H PRN   glucose (GLUTOSE) 40 % oral gel 15 g PRN   dextrose 50 % IV solution PRN   glucagon (rDNA) injection 1 mg PRN   dextrose 5 % solution PRN   insulin lispro (HUMALOG) injection vial 0-12 Units TID WC   insulin lispro (HUMALOG) injection vial 0-6 Units Nightly   darbepoetin dustin-polysorbate (ARANESP) injection 100 mcg Weekly   [START ON 8/16/2019] furosemide (LASIX) tablet 80 mg Daily   [START ON 8/16/2019] vancomycin (VANCOCIN) intermittent dosing (placeholder) RX Placeholder   vancomycin (VANCOCIN) oral solution 250 mg 4 times per day   furosemide (LASIX) injection 80 mg Once     Current Facility-Administered Medications   Medication Dose Route Frequency Provider Last Rate Last Dose    gemfibrozil (LOPID) tablet 600 mg  600 mg Oral BID AC Gm Guy MD   600 mg at 08/15/19 0537    allopurinol (ZYLOPRIM) tablet 100 mg  100 mg Oral Daily Gm Guy MD   100 mg at 08/15/19 0959    ondansetron (ZOFRAN-ODT) disintegrating tablet 4 mg  4 mg Oral Q8H PRN Gm Guy MD        hydrALAZINE (APRESOLINE) tablet 50 mg  50 mg Oral TID mG Guy MD   50 mg at 08/15/19 0959    sevelamer (RENVELA) tablet 800 mg  800 mg Oral TID  Gm Guy MD   800 mg at 08/15/19 09    heparin (porcine) +weakness   · Integumentary: No rash or pruritis  · Neurological: No TIA or stroke symptoms  · Psychiatric: No anxiety or depression  · Endocrine: No malaise, + fatigue or temperature intolerance  · Hematologic/Lymphatic: No bleeding problems, blood clots or swollen lymph nodes  · Allergic/Immunologic: No nasal congestion or hives  All systems negative except as marked. Physical Examination:    Vitals:    08/15/19 0327   BP: (!) 115/54   Pulse: 82   Resp: 18   Temp: 98.9 °F (37.2 °C)   TempSrc: Oral   SpO2: 94%   Weight: 134 lb 8 oz (61 kg)   Height: 4' 11\" (1.499 m)       General Appearance:  No distress, conversant  Constitutional:  Well developed, No acute distress, Non-toxic appearance. HENT:  Normocephalic, Atraumatic, Bilateral external ears normal, Oropharynx moist, No oral exudates, Nose normal.   Neck- trachea is midline    Lymphatics: no palpable lymph nodes  Eyes:  PERRL, Conjunctiva normal, No discharge. Respiratory:  Decreased breath sounds, No respiratory distress, No wheezing, No chest tenderness, no use of accessory muscles  Cardiovascular: (PMI):  RRR, S1 and S2 audible, no murmur appreciated, JVD not noted,  apex non displaced, no lifts, no thrills, edema to lower legs Absent  fistula noted to right upper arm  Abdomen /GI: Soft, No tenderness, No masses, No gross visceromegaly   :  No costovertebral angle tenderness   Musculoskeletal:  no edema, no tenderness, no deformities. Back- no tenderness  Integument:  Well hydrated, no rash   Lymphatic:  No lymphadenopathy noted   Neurologic:  Alert & oriented x 3, CN 2-12 grossly normal      Medical decision making and Data review:    Lab Review   Recent Labs     08/15/19  0426   WBC 21.6*   HGB 8.2*   HCT 26.0*         Recent Labs     08/15/19  0426   *   K 3.3*   CL 94*   CO2 22   BUN 32*   CREATININE 5.7*     No results for input(s): AST, ALT, ALB, BILIDIR, BILITOT, ALKPHOS in the last 72 hours.   Recent Labs     08/15/19  5888

## 2019-08-15 NOTE — CONSULTS
Admission medications    Medication Sig Start Date End Date Taking? Authorizing Provider   sevelamer (RENVELA) 800 MG tablet Take 1 tablet by mouth 3 times daily   Yes Historical Provider, MD   hydrALAZINE (APRESOLINE) 50 MG tablet Take 50 mg by mouth 3 times daily   Yes Historical Provider, MD   lovastatin (MEVACOR) 20 MG tablet Take 20 mg by mouth every evening   Yes Historical Provider, MD   allopurinol (ZYLOPRIM) 100 MG tablet Take 100 mg by mouth daily   Yes Historical Provider, MD   ondansetron (ZOFRAN) 4 MG tablet Take 4 mg by mouth every 8 hours as needed for Nausea or Vomiting   Yes Historical Provider, MD   furosemide (LASIX) 40 MG tablet Take 80 mg by mouth daily    Yes Historical Provider, MD   gemfibrozil (LOPID) 600 MG tablet Take 600 mg by mouth 2 times daily (before meals)   Yes Historical Provider, MD   Multiple Vitamin (MULTI-VITAMIN DAILY PO) Take 1 tablet by mouth daily Subisitute for RenaPlex-D    Historical Provider, MD   insulin lispro (HUMALOG) 100 UNIT/ML injection cartridge Inject 10 Units into the skin 3 times daily (before meals) Sliding scale     Historical Provider, MD        Allergies:  Tape [adhesive tape]; Amlodipine besylate; and Terramycin [oxytetracycline]    Social History:   TOBACCO:   reports that she quit smoking about 2 years ago. Her smoking use included cigarettes. She has a 56.25 pack-year smoking history. She has never used smokeless tobacco.  ETOH:   reports that she does not drink alcohol.   OCCUPATION:      Family History:       Problem Relation Age of Onset    Diabetes Mother     High Blood Pressure Mother     Heart Disease Father            Physical Exam:    Vitals: BP (!) 115/54   Pulse 82   Temp 98.9 °F (37.2 °C) (Oral)   Resp 18   Ht 4' 11\" (1.499 m)   Wt 134 lb 8 oz (61 kg)   LMP 01/07/2009   SpO2 94%   BMI 27.17 kg/m²   General appearance: in no acute distress, appears stated age  Skin: Skin color, texture, turgor normal. No rashes or lesions  HEENT: normocephalic, atraumatic  Neck: supple, trachea midline  Lungs: clear to auscultation bilaterally, breathing comfortably  Heart[de-identified] regular rate and rhythm, S1, S2 normal,  Abdomen: soft, non-tender; bowel sounds normal; no masses,   Extremities: extremities normal, atraumatic, no cyanosis or edema  Neurologic: Mental status: alert, oriented, interactive, following commands  Psychiatric: mood and affect appropriate    CBC:   Recent Labs     08/15/19  0426   WBC 21.6*   HGB 8.2*        BMP:    Recent Labs     08/15/19  0426   *   K 3.3*   CL 94*   CO2 22   BUN 32*   CREATININE 5.7*   GLUCOSE 98     Hepatic: No results for input(s): AST, ALT, ALB, BILITOT, ALKPHOS in the last 72 hours. Troponin: No results for input(s): TROPONINI in the last 72 hours. BNP: No results for input(s): BNP in the last 72 hours. Lipids: No results for input(s): CHOL, HDL in the last 72 hours. Invalid input(s): LDLCALCU  ABGs: No results found for: PHART, PO2ART, WWZ8ODH  INR: No results for input(s): INR in the last 72 hours. I/O last 3 completed shifts: In: 80 [P.O.:60; IV Piggyback:30]  Out: -       No intake/output data recorded. -----------------------------------------------------------------      Assessment and Recommendations   - Diarrhea: check c.  Diff  - Leukocytosis: low suspicion for pna currently  - soa: has bilateral pleural effusions: give lasix 80mgiv bid today, thoracentesis if needed and hd tomorrow, do not think pna currently  - esrd on hd mf: HD tomorrow, give iv lasix today  - Elevated troponin likely due to sepsis  - HTN: controlled  - Bradycardia at previous admission: coreg was discontinued  - dm2  - anemia of esrd: give darbo        Electronically signed by Mariano Ramirez DO on 8/15/2019 at 7:33 MD Gabrielle Barajas DO  [address]  [place], [place]  PHONE: [phone]  FAX: [phone]

## 2019-08-15 NOTE — PROGRESS NOTES
Pt is a new admit and states she has had at least 3 loose stools over past 24 hours. . Cdiff contact plus isolation precautions initiated and CDIFF specimen sent to lab.

## 2019-08-15 NOTE — CARE COORDINATION
Patient transferred to [facility] on 8/22/2019    On 8/22, patients daughter requests patient to be transferred to [facility] per Dr Lindsay Hope is for patient to be discharged home with daughter and to have IV antibiotics for 6 weeks. [facility] in [place] received referral and will be able to do atb. 8/21/2019    CN met with patient on 8/16, daughter Mejia Salcido at bedside, able to do education    CN met with patient on 8/15, patient sleeping, will check back    Initial Patient Assessment Note     What symptoms brought you to the hospital?  Patient came in with not feeling herself, daughter reported patient didn't seem herself. Was recently discharged from [facility] for fatigue and low HR. Daughter took patient to [facility] ED and patient was diagnosed with pneumonia and was sent here for admission. Where do you live:       [x] Home with daughter       [] Independent Living     [] Assisted Living                      [] [address]   [] Homeless/Homeless Shelter    [] Group Home      Primary Care Physician:  [x] Dr Ramonita Pineda                    [] None    [] PA/NP              [] [address] [facility] Physician              Pulmonary Physician:    Consulted:  [] Teo Rivers     [] Yes   [] Sa[facility]l     [] No  [] [place]              [] [address]:    [] Other:      Pharmacy:      Meds to Beds:  [] [facility]      [] Yes   [] [facility]       [] No   [] Juanis Leggett   [x] [facility]      [] Vinny Kearney    [] [facility]   [] [facility]   [] [facility]   [] [address] [facility]   [] [facility]   [] [facility]   [] [facility]   [] [employer]   [] Other:    Home Care:         [] Yes, Name:       [x] No            SNF:  [] Yes, Name:   [x] No           Do you have:            DME Company:  [] [facility]     [] [facility]    [] BIPAP/CPAP    [] [employer]   [] Nebulizer     [] [employer]                  [x] None of the above    [] Other:       Home COPD Medications:  Inhalers:  Nebulizers: Other:    Are you out of any medications?    [] Yes   [x] No   Can you pay for your

## 2019-08-16 ENCOUNTER — APPOINTMENT (OUTPATIENT)
Dept: NUCLEAR MEDICINE | Age: 64
DRG: 280 | End: 2019-08-16
Attending: INTERNAL MEDICINE
Payer: MEDICARE

## 2019-08-16 LAB
ANION GAP SERPL CALCULATED.3IONS-SCNC: 15 MMOL/L (ref 4–16)
BASOPHILS ABSOLUTE: 0.1 K/CU MM
BASOPHILS RELATIVE PERCENT: 0.3 % (ref 0–1)
BUN BLDV-MCNC: 40 MG/DL (ref 6–23)
CALCIUM SERPL-MCNC: 8.4 MG/DL (ref 8.3–10.6)
CHLORIDE BLD-SCNC: 97 MMOL/L (ref 99–110)
CO2: 20 MMOL/L (ref 21–32)
CREAT SERPL-MCNC: 6.4 MG/DL (ref 0.6–1.1)
DIFFERENTIAL TYPE: ABNORMAL
DOSE AMOUNT: NORMAL
DOSE TIME: NORMAL
EOSINOPHILS ABSOLUTE: 0.2 K/CU MM
EOSINOPHILS RELATIVE PERCENT: 1 % (ref 0–3)
GFR AFRICAN AMERICAN: 8 ML/MIN/1.73M2
GFR NON-AFRICAN AMERICAN: 7 ML/MIN/1.73M2
GLUCOSE BLD-MCNC: 136 MG/DL (ref 70–99)
GLUCOSE BLD-MCNC: 81 MG/DL (ref 70–99)
GLUCOSE BLD-MCNC: 81 MG/DL (ref 70–99)
HCT VFR BLD CALC: 26.9 % (ref 37–47)
HEMOGLOBIN: 8.2 GM/DL (ref 12.5–16)
IMMATURE NEUTROPHIL %: 0.7 % (ref 0–0.43)
LACTATE: 0.8 MMOL/L (ref 0.4–2)
LV EF: 67 %
LVEF MODALITY: NORMAL
LYMPHOCYTES ABSOLUTE: 1 K/CU MM
LYMPHOCYTES RELATIVE PERCENT: 4.8 % (ref 24–44)
MCH RBC QN AUTO: 31.3 PG (ref 27–31)
MCHC RBC AUTO-ENTMCNC: 30.5 % (ref 32–36)
MCV RBC AUTO: 102.7 FL (ref 78–100)
MONOCYTES ABSOLUTE: 1 K/CU MM
MONOCYTES RELATIVE PERCENT: 5 % (ref 0–4)
NUCLEATED RBC %: 0 %
PDW BLD-RTO: 15.8 % (ref 11.7–14.9)
PLATELET # BLD: 259 K/CU MM (ref 140–440)
PMV BLD AUTO: 9.8 FL (ref 7.5–11.1)
POTASSIUM SERPL-SCNC: 4 MMOL/L (ref 3.5–5.1)
PROCALCITONIN: 10.43
RBC # BLD: 2.62 M/CU MM (ref 4.2–5.4)
SEGMENTED NEUTROPHILS ABSOLUTE COUNT: 18.3 K/CU MM
SEGMENTED NEUTROPHILS RELATIVE PERCENT: 88.2 % (ref 36–66)
SODIUM BLD-SCNC: 132 MMOL/L (ref 135–145)
TOTAL IMMATURE NEUTOROPHIL: 0.14 K/CU MM
TOTAL NUCLEATED RBC: 0 K/CU MM
VANCOMYCIN RANDOM: 16 UG/ML
VANCOMYCIN RANDOM: NORMAL UG/ML
WBC # BLD: 20.7 K/CU MM (ref 4–10.5)

## 2019-08-16 PROCEDURE — APPSS45 APP SPLIT SHARED TIME 31-45 MINUTES: Performed by: NURSE PRACTITIONER

## 2019-08-16 PROCEDURE — 6370000000 HC RX 637 (ALT 250 FOR IP): Performed by: INTERNAL MEDICINE

## 2019-08-16 PROCEDURE — 83605 ASSAY OF LACTIC ACID: CPT

## 2019-08-16 PROCEDURE — 2500000003 HC RX 250 WO HCPCS: Performed by: HOSPITALIST

## 2019-08-16 PROCEDURE — 2580000003 HC RX 258: Performed by: HOSPITALIST

## 2019-08-16 PROCEDURE — 82962 GLUCOSE BLOOD TEST: CPT

## 2019-08-16 PROCEDURE — 2140000000 HC CCU INTERMEDIATE R&B

## 2019-08-16 PROCEDURE — APPNB15 APP NON BILLABLE TIME 0-15 MINS: Performed by: NURSE PRACTITIONER

## 2019-08-16 PROCEDURE — 6360000002 HC RX W HCPCS: Performed by: INTERNAL MEDICINE

## 2019-08-16 PROCEDURE — 87081 CULTURE SCREEN ONLY: CPT

## 2019-08-16 PROCEDURE — 99232 SBSQ HOSP IP/OBS MODERATE 35: CPT | Performed by: INTERNAL MEDICINE

## 2019-08-16 PROCEDURE — 6360000002 HC RX W HCPCS: Performed by: HOSPITALIST

## 2019-08-16 PROCEDURE — A9500 TC99M SESTAMIBI: HCPCS | Performed by: INTERNAL MEDICINE

## 2019-08-16 PROCEDURE — 5A1D70Z PERFORMANCE OF URINARY FILTRATION, INTERMITTENT, LESS THAN 6 HOURS PER DAY: ICD-10-PCS | Performed by: INTERNAL MEDICINE

## 2019-08-16 PROCEDURE — 90935 HEMODIALYSIS ONE EVALUATION: CPT

## 2019-08-16 PROCEDURE — 3430000000 HC RX DIAGNOSTIC RADIOPHARMACEUTICAL: Performed by: INTERNAL MEDICINE

## 2019-08-16 PROCEDURE — 80048 BASIC METABOLIC PNL TOTAL CA: CPT

## 2019-08-16 PROCEDURE — 85025 COMPLETE CBC W/AUTO DIFF WBC: CPT

## 2019-08-16 PROCEDURE — 93017 CV STRESS TEST TRACING ONLY: CPT

## 2019-08-16 PROCEDURE — 93226 XTRNL ECG REC<48 HR SCAN A/R: CPT

## 2019-08-16 PROCEDURE — 78452 HT MUSCLE IMAGE SPECT MULT: CPT

## 2019-08-16 PROCEDURE — 93225 XTRNL ECG REC<48 HRS REC: CPT

## 2019-08-16 PROCEDURE — 84145 PROCALCITONIN (PCT): CPT

## 2019-08-16 PROCEDURE — 36415 COLL VENOUS BLD VENIPUNCTURE: CPT

## 2019-08-16 PROCEDURE — 93308 TTE F-UP OR LMTD: CPT

## 2019-08-16 PROCEDURE — 80202 ASSAY OF VANCOMYCIN: CPT

## 2019-08-16 PROCEDURE — 2580000003 HC RX 258: Performed by: INTERNAL MEDICINE

## 2019-08-16 RX ORDER — NAFCILLIN 2 G/100ML
2 INJECTION, SOLUTION INTRAVENOUS EVERY 4 HOURS
Status: DISCONTINUED | OUTPATIENT
Start: 2019-08-16 | End: 2019-08-16 | Stop reason: CLARIF

## 2019-08-16 RX ORDER — VANCOMYCIN HYDROCHLORIDE 1 G/200ML
1000 INJECTION, SOLUTION INTRAVENOUS ONCE
Status: COMPLETED | OUTPATIENT
Start: 2019-08-16 | End: 2019-08-17

## 2019-08-16 RX ORDER — ONDANSETRON 2 MG/ML
2 INJECTION INTRAMUSCULAR; INTRAVENOUS
Status: COMPLETED | OUTPATIENT
Start: 2019-08-16 | End: 2019-08-16

## 2019-08-16 RX ADMIN — Medication 250 MG: at 05:06

## 2019-08-16 RX ADMIN — AZITHROMYCIN MONOHYDRATE 250 MG: 500 INJECTION, POWDER, LYOPHILIZED, FOR SOLUTION INTRAVENOUS at 05:06

## 2019-08-16 RX ADMIN — VANCOMYCIN HYDROCHLORIDE 1000 MG: 1 INJECTION, SOLUTION INTRAVENOUS at 22:54

## 2019-08-16 RX ADMIN — HYDRALAZINE HYDROCHLORIDE 50 MG: 50 TABLET, FILM COATED ORAL at 21:50

## 2019-08-16 RX ADMIN — CEFTRIAXONE 1 G: 1 INJECTION, POWDER, FOR SOLUTION INTRAMUSCULAR; INTRAVENOUS at 04:33

## 2019-08-16 RX ADMIN — EPOETIN ALFA-EPBX 8000 UNITS: 4000 INJECTION, SOLUTION INTRAVENOUS; SUBCUTANEOUS at 13:26

## 2019-08-16 RX ADMIN — HEPARIN SODIUM 5000 UNITS: 5000 INJECTION INTRAVENOUS; SUBCUTANEOUS at 21:50

## 2019-08-16 RX ADMIN — REGADENOSON 0.4 MG: 0.08 INJECTION, SOLUTION INTRAVENOUS at 08:55

## 2019-08-16 RX ADMIN — FUROSEMIDE 80 MG: 40 TABLET ORAL at 17:02

## 2019-08-16 RX ADMIN — Medication 250 MG: at 17:02

## 2019-08-16 RX ADMIN — Medication 250 MG: at 23:32

## 2019-08-16 RX ADMIN — Medication 30 MILLICURIE: at 10:41

## 2019-08-16 RX ADMIN — SEVELAMER CARBONATE 800 MG: 800 TABLET, FILM COATED ORAL at 17:02

## 2019-08-16 RX ADMIN — ONDANSETRON HYDROCHLORIDE 2 MG: 2 INJECTION, SOLUTION INTRAMUSCULAR; INTRAVENOUS at 11:20

## 2019-08-16 RX ADMIN — GEMFIBROZIL 600 MG: 600 TABLET ORAL at 17:02

## 2019-08-16 RX ADMIN — ALLOPURINOL 100 MG: 100 TABLET ORAL at 17:02

## 2019-08-16 RX ADMIN — HEPARIN SODIUM 5000 UNITS: 5000 INJECTION INTRAVENOUS; SUBCUTANEOUS at 04:25

## 2019-08-16 RX ADMIN — NAFCILLIN SODIUM: 2 INJECTION, POWDER, LYOPHILIZED, FOR SOLUTION INTRAMUSCULAR; INTRAVENOUS at 21:49

## 2019-08-16 RX ADMIN — Medication 10 MILLICURIE: at 10:42

## 2019-08-16 ASSESSMENT — PAIN SCALES - GENERAL
PAINLEVEL_OUTOF10: 0
PAINLEVEL_OUTOF10: 0

## 2019-08-16 NOTE — PROGRESS NOTES
Patient tolerated HD treatment well, 2 L removed. Patient educated on access care, understanding verbalized by patient. Patient c/o mild nausea, resolved after 2 mg of zofran given. Patient given retacrit for anemia. Pressure dressings applied to needle sites. Next HD treatment is Monday.  Report given to MICHELL Correia.

## 2019-08-16 NOTE — PROGRESS NOTES
Lab notified this nurse about pt being staph aurues bacteremia positive, I consulted infection diseases doctor last night for this, and he is aware.

## 2019-08-16 NOTE — PROGRESS NOTES
Cardiology Progress Note     Today's Plan: NM stress test   Echo     Admit Date:  8/15/2019    Consult reason/ Seen today for: elevated troponin     Subjective and  Overnight Events:  She is resting quietly  There is no chest pain or SOB. Assessment / Plan / Recommendation:   1. Elevated Troponin: she has ruled in for a NSTEMI per enzyme criteria, however in the setting of renal failure, sepsis and acute infection, doubt ACS most probably demand ischemia related leak, however she has several cardiac risk factors including DM, CKD, HTN, HLD.-  Cardiolite completed normal perfusion and EF. 2. Endocarditis echo completed- density noted on tricuspid valve C/W endocarditis vegetation-will check with ID regarding if KAYLIE is needed-  ATB per PCP  3. Abnormal EKG-  Episodes of bradycardia and prolong QT- will check Holter avoid QT prolonging medicaitons   4. HTN- controlled- on hydralazine  -Echo completed- EF 55-60% mild LVH   5. Pneumonia per primary  6. ESRD- on HD- nephrology following       History of Presenting Illness:    Chief complain on admission : 71 y. o.year old who is admitted forNo chief complaint on file. Past medical history:    has a past medical history of Abscess, perineum, Chronic kidney disease, Diabetes 1.5, managed as type 1 ([address] Diabetes mellitus ([address] Gout, Hemodialysis patient ([address] Hx of pancreatitis, Hyperlipidemia, Hypertension, Macular degeneration, Open wound of genital labia, and Proteinuria. Past surgical history:   has a past surgical history that includes Tubal ligation (40+ yrs ago); Foot surgery (1987); eye surgery; Kidney biopsy; AV fistula creation (Right, 03/2019); and Tunneled venous catheter placement (Left, 06/25/2019). Social History:   reports that she quit smoking about 2 years ago. Her smoking use included cigarettes. She has a 56.25 pack-year smoking history.  She has never used  insulin lispro  0-6 Units Subcutaneous Nightly    darbepoetin dustin-polysorbate  100 mcg Subcutaneous Weekly    furosemide  80 mg Oral Daily    vancomycin (VANCOCIN) intermittent dosing (placeholder)   Other RX Placeholder    vancomycin  250 mg Oral 4 times per day      dextrose       ondansetron, ipratropium-albuterol, glucose, dextrose, glucagon (rDNA), dextrose, acetaminophen    Lab Data:  CBC:   Recent Labs     08/15/19  0426 08/16/19  0258   WBC 21.6* 20.7*   HGB 8.2* 8.2*   HCT 26.0* 26.9*   .6* 102.7*    259     BMP:   Recent Labs     08/15/19  0426 08/16/19  0258   * 132*   K 3.3* 4.0   CL 94* 97*   CO2 22 20*   BUN 32* 40*   CREATININE 5.7* 6.4*     PT/INR: No results for input(s): PROTIME, INR in the last 72 hours. BNP:  No results for input(s): PROBNP in the last 72 hours. TROPONIN:   Recent Labs     08/15/19  0426 08/15/19  1032 08/15/19  1707   TROPONINT 0.313* 0.291* 0.290*        ECHO : 08/16/2019  This is a limited echo to check for vegetations. Patient had a complete   study on 8/5/2019.   An Echo density is noted on the tricuspid valve, measuring 1.25 cm,   Suggestive of endocarditis vegetation.   Mild to moderate tricuspid regurgitation.  RVSP is 44 mmHg. NM Myocardial Spect  Normal perfusion and EF      Impression:  Active Problems:    Pneumonia    Leukocytosis    Troponin level elevated  Resolved Problems:    * No resolved hospital problems. *       All labs, medications and tests reviewed by myself, continue all other medications of all above medical condition listed as is except for changes mentioned above. Thank you   Please call with questions. Electronically signed by SAÚL Barksdale CNP on 8/16/2019 at 5:07 PM   I have seen ,spoken to  and examined this patient personally, independently of the nurse practitioner. I have reviewed the hospital care given to date and reviewed all pertinent labs and imaging. The plan was developed mutually at the

## 2019-08-17 ENCOUNTER — APPOINTMENT (OUTPATIENT)
Dept: CT IMAGING | Age: 64
DRG: 280 | End: 2019-08-17
Attending: INTERNAL MEDICINE
Payer: MEDICARE

## 2019-08-17 LAB
ANION GAP SERPL CALCULATED.3IONS-SCNC: 15 MMOL/L (ref 4–16)
BASOPHILS ABSOLUTE: 0.1 K/CU MM
BASOPHILS RELATIVE PERCENT: 0.3 % (ref 0–1)
BUN BLDV-MCNC: 16 MG/DL (ref 6–23)
CALCIUM SERPL-MCNC: 8.3 MG/DL (ref 8.3–10.6)
CHLORIDE BLD-SCNC: 98 MMOL/L (ref 99–110)
CO2: 24 MMOL/L (ref 21–32)
CREAT SERPL-MCNC: 3.5 MG/DL (ref 0.6–1.1)
CULTURE: NORMAL
DIFFERENTIAL TYPE: ABNORMAL
EOSINOPHILS ABSOLUTE: 0.1 K/CU MM
EOSINOPHILS RELATIVE PERCENT: 0.6 % (ref 0–3)
FERRITIN: 882 NG/ML (ref 15–150)
FOLATE: 17.8 NG/ML (ref 3.1–17.5)
GFR AFRICAN AMERICAN: 16 ML/MIN/1.73M2
GFR NON-AFRICAN AMERICAN: 13 ML/MIN/1.73M2
GLUCOSE BLD-MCNC: 115 MG/DL (ref 70–99)
GLUCOSE BLD-MCNC: 124 MG/DL (ref 70–99)
GLUCOSE BLD-MCNC: 126 MG/DL (ref 70–99)
GLUCOSE BLD-MCNC: 99 MG/DL (ref 70–99)
HCT VFR BLD CALC: 32.6 % (ref 37–47)
HEMOGLOBIN: 9.4 GM/DL (ref 12.5–16)
IMMATURE NEUTROPHIL %: 0.6 % (ref 0–0.43)
IRON: 36 UG/DL (ref 37–145)
LACTATE: 1.8 MMOL/L (ref 0.4–2)
LYMPHOCYTES ABSOLUTE: 1.5 K/CU MM
LYMPHOCYTES RELATIVE PERCENT: 8.9 % (ref 24–44)
Lab: NORMAL
MCH RBC QN AUTO: 31.1 PG (ref 27–31)
MCHC RBC AUTO-ENTMCNC: 28.8 % (ref 32–36)
MCV RBC AUTO: 107.9 FL (ref 78–100)
MONOCYTES ABSOLUTE: 1 K/CU MM
MONOCYTES RELATIVE PERCENT: 6.2 % (ref 0–4)
NUCLEATED RBC %: 0 %
PCT TRANSFERRIN: 20 % (ref 10–44)
PDW BLD-RTO: 15.9 % (ref 11.7–14.9)
PLATELET # BLD: 344 K/CU MM (ref 140–440)
PMV BLD AUTO: 9.7 FL (ref 7.5–11.1)
POTASSIUM SERPL-SCNC: 4 MMOL/L (ref 3.5–5.1)
PROCALCITONIN: 9.96
RBC # BLD: 3.02 M/CU MM (ref 4.2–5.4)
RETICULOCYTE COUNT PCT: 2.3 % (ref 0.2–2.2)
SEGMENTED NEUTROPHILS ABSOLUTE COUNT: 13.5 K/CU MM
SEGMENTED NEUTROPHILS RELATIVE PERCENT: 83.4 % (ref 36–66)
SODIUM BLD-SCNC: 137 MMOL/L (ref 135–145)
SPECIMEN: NORMAL
TOTAL IMMATURE NEUTOROPHIL: 0.1 K/CU MM
TOTAL IRON BINDING CAPACITY: 180 UG/DL (ref 250–450)
TOTAL NUCLEATED RBC: 0 K/CU MM
UNSATURATED IRON BINDING CAPACITY: 144 UG/DL (ref 110–370)
VITAMIN B-12: 1983 PG/ML (ref 211–911)
WBC # BLD: 16.2 K/CU MM (ref 4–10.5)

## 2019-08-17 PROCEDURE — 83550 IRON BINDING TEST: CPT

## 2019-08-17 PROCEDURE — 87040 BLOOD CULTURE FOR BACTERIA: CPT

## 2019-08-17 PROCEDURE — 94761 N-INVAS EAR/PLS OXIMETRY MLT: CPT

## 2019-08-17 PROCEDURE — 84145 PROCALCITONIN (PCT): CPT

## 2019-08-17 PROCEDURE — 82728 ASSAY OF FERRITIN: CPT

## 2019-08-17 PROCEDURE — 2140000000 HC CCU INTERMEDIATE R&B

## 2019-08-17 PROCEDURE — 82962 GLUCOSE BLOOD TEST: CPT

## 2019-08-17 PROCEDURE — 82746 ASSAY OF FOLIC ACID SERUM: CPT

## 2019-08-17 PROCEDURE — 87077 CULTURE AEROBIC IDENTIFY: CPT

## 2019-08-17 PROCEDURE — 85025 COMPLETE CBC W/AUTO DIFF WBC: CPT

## 2019-08-17 PROCEDURE — 6370000000 HC RX 637 (ALT 250 FOR IP): Performed by: INTERNAL MEDICINE

## 2019-08-17 PROCEDURE — 85045 AUTOMATED RETICULOCYTE COUNT: CPT

## 2019-08-17 PROCEDURE — 71250 CT THORAX DX C-: CPT

## 2019-08-17 PROCEDURE — 36415 COLL VENOUS BLD VENIPUNCTURE: CPT

## 2019-08-17 PROCEDURE — 2580000003 HC RX 258: Performed by: HOSPITALIST

## 2019-08-17 PROCEDURE — 99233 SBSQ HOSP IP/OBS HIGH 50: CPT | Performed by: INTERNAL MEDICINE

## 2019-08-17 PROCEDURE — 82607 VITAMIN B-12: CPT

## 2019-08-17 PROCEDURE — 83540 ASSAY OF IRON: CPT

## 2019-08-17 PROCEDURE — 2500000003 HC RX 250 WO HCPCS: Performed by: HOSPITALIST

## 2019-08-17 PROCEDURE — 6360000002 HC RX W HCPCS: Performed by: INTERNAL MEDICINE

## 2019-08-17 PROCEDURE — 80048 BASIC METABOLIC PNL TOTAL CA: CPT

## 2019-08-17 PROCEDURE — 87147 CULTURE TYPE IMMUNOLOGIC: CPT

## 2019-08-17 PROCEDURE — 83605 ASSAY OF LACTIC ACID: CPT

## 2019-08-17 RX ADMIN — GEMFIBROZIL 600 MG: 600 TABLET ORAL at 17:12

## 2019-08-17 RX ADMIN — GEMFIBROZIL 600 MG: 600 TABLET ORAL at 09:03

## 2019-08-17 RX ADMIN — HEPARIN SODIUM 5000 UNITS: 5000 INJECTION INTRAVENOUS; SUBCUTANEOUS at 13:57

## 2019-08-17 RX ADMIN — HEPARIN SODIUM 5000 UNITS: 5000 INJECTION INTRAVENOUS; SUBCUTANEOUS at 21:05

## 2019-08-17 RX ADMIN — SEVELAMER CARBONATE 800 MG: 800 TABLET, FILM COATED ORAL at 09:03

## 2019-08-17 RX ADMIN — HYDRALAZINE HYDROCHLORIDE 50 MG: 50 TABLET, FILM COATED ORAL at 13:57

## 2019-08-17 RX ADMIN — SEVELAMER CARBONATE 800 MG: 800 TABLET, FILM COATED ORAL at 11:53

## 2019-08-17 RX ADMIN — NAFCILLIN SODIUM: 2 INJECTION, POWDER, LYOPHILIZED, FOR SOLUTION INTRAMUSCULAR; INTRAVENOUS at 02:21

## 2019-08-17 RX ADMIN — HYDRALAZINE HYDROCHLORIDE 50 MG: 50 TABLET, FILM COATED ORAL at 21:05

## 2019-08-17 RX ADMIN — NAFCILLIN SODIUM: 2 INJECTION, POWDER, LYOPHILIZED, FOR SOLUTION INTRAMUSCULAR; INTRAVENOUS at 06:03

## 2019-08-17 RX ADMIN — Medication 250 MG: at 06:03

## 2019-08-17 RX ADMIN — NAFCILLIN SODIUM: 2 INJECTION, POWDER, LYOPHILIZED, FOR SOLUTION INTRAMUSCULAR; INTRAVENOUS at 09:03

## 2019-08-17 RX ADMIN — NAFCILLIN SODIUM: 2 INJECTION, POWDER, LYOPHILIZED, FOR SOLUTION INTRAMUSCULAR; INTRAVENOUS at 21:05

## 2019-08-17 RX ADMIN — Medication 250 MG: at 11:53

## 2019-08-17 RX ADMIN — NAFCILLIN SODIUM: 2 INJECTION, POWDER, LYOPHILIZED, FOR SOLUTION INTRAMUSCULAR; INTRAVENOUS at 13:57

## 2019-08-17 RX ADMIN — FUROSEMIDE 80 MG: 40 TABLET ORAL at 09:03

## 2019-08-17 RX ADMIN — SEVELAMER CARBONATE 800 MG: 800 TABLET, FILM COATED ORAL at 17:12

## 2019-08-17 RX ADMIN — ALLOPURINOL 100 MG: 100 TABLET ORAL at 09:04

## 2019-08-17 RX ADMIN — HEPARIN SODIUM 5000 UNITS: 5000 INJECTION INTRAVENOUS; SUBCUTANEOUS at 04:23

## 2019-08-17 RX ADMIN — ONDANSETRON 4 MG: 4 TABLET, ORALLY DISINTEGRATING ORAL at 14:59

## 2019-08-17 RX ADMIN — Medication 250 MG: at 17:12

## 2019-08-17 RX ADMIN — NAFCILLIN SODIUM: 2 INJECTION, POWDER, LYOPHILIZED, FOR SOLUTION INTRAMUSCULAR; INTRAVENOUS at 17:12

## 2019-08-17 RX ADMIN — HYDRALAZINE HYDROCHLORIDE 50 MG: 50 TABLET, FILM COATED ORAL at 09:04

## 2019-08-17 NOTE — PROGRESS NOTES
Cardiology Progress Note     Admit Date:  8/15/2019    Consult reason/ Seen today for :   Endocarditis  Abnormal troponin  HTN    Subjective and  Overnight Events :  Hard of hearing and cannot see well, but reports breathing is better. Echo shows possible vegetation on tricuspid valve moderate regurgitation        Chief complain on admission : 71 y. o.year old who is admitted forNo chief complaint on file. Assessment / Plan:  Elevated troponin : will continue medical management for now. Continue Aspirin and anti anginal therapy. Stress test shows no ischemia  Tricuspid endocarditis antibiotic treatment as per primary team and infectious diseases  Hypertension:  End-stage renal disease on dialysis as per nephrology  HTN: stable, continue To titrate up medication as needed  DVT Prophylaxis if no contraindication    Past medical history:    has a past medical history of Abscess, perineum, Chronic kidney disease, Diabetes 1.5, managed as type 1 ([facility] Diabetes mellitus ([facility] Gout, Hemodialysis patient ([facility] Hx of pancreatitis, Hyperlipidemia, Hypertension, Macular degeneration, Open wound of genital labia, and Proteinuria. Past surgical history:   has a past surgical history that includes Tubal ligation (40+ yrs ago); Foot surgery (1987); eye surgery; Kidney biopsy; AV fistula creation (Right, 03/2019); and Tunneled venous catheter placement (Left, 06/25/2019). Social History:   reports that she quit smoking about 2 years ago. Her smoking use included cigarettes. She has a 56.25 pack-year smoking history. She has never used smokeless tobacco. She reports that she does not drink alcohol or use drugs. Family history:  family history includes Diabetes in her mother; Heart Disease in her father; High Blood Pressure in her mother.     Allergies   Allergen Reactions    Tape [place] Headings Tape] Itching and Rash     Paper tape OK      Amlodipine Besylate      Passed out     Terramycin [Oxytetracycline] Other (See Comments)     \"swelling of my face\"       Review of Systems:    All 14 systems were reviewed and are negative  Except for the positive findings  which as documented     /60   Pulse 86   Temp 97.7 °F (36.5 °C) (Oral)   Resp 14   Ht 4' 11\" (1.499 m)   Wt 136 lb 11.2 oz (62 kg)   LMP 01/07/2009   SpO2 95%   BMI 27.61 kg/m²   No intake or output data in the 24 hours ending 08/17/19 1442  Physical Exam:  Constitutional:  Well developed, Well nourished, No acute distress, Non-toxic appearance. HENT:  Normocephalic, Atraumatic, Bilateral external ears normal, Oropharynx moist, No oral exudates, Nose normal. Neck- Normal range of motion, No tenderness, Supple, No stridor. Eyes:  PERRL, EOMI, Conjunctiva normal, No discharge. Respiratory:  Normal breath sounds, No respiratory distress, No wheezing, No chest tenderness. Cardiovascular:  Normal heart rate, Normal rhythm, No murmurs, No rubs, No gallops, JVP not elevated  Abdomen/GI:  Bowel sounds normal, Soft, No tenderness, No masses, No pulsatile masses. Musculoskeletal:  Intact distal pulses, No edema, No tenderness, No cyanosis, No clubbing. Good range of motion in all major joints. No tenderness to palpation or major deformities noted. Back- No tenderness. Integument:  Warm, Dry, No erythema, No rash. Lymphatic:  No lymphadenopathy noted. Neurologic:  Alert & oriented x 3, Normal motor function, Normal sensory function, No focal deficits noted.    Psychiatric:  Affect  and  Mood :no change    Medications:    IVPB builder   Intravenous Q4H    gemfibrozil  600 mg Oral BID AC    allopurinol  100 mg Oral Daily    hydrALAZINE  50 mg Oral TID    sevelamer  800 mg Oral TID WC    heparin (porcine)  5,000 Units Subcutaneous Q8H    insulin lispro  0-12 Units Subcutaneous TID WC    insulin lispro  0-6 Units Subcutaneous Nightly    darbepoetin dustin-polysorbate  100 mcg Subcutaneous Weekly    furosemide  80 mg Oral Daily    vancomycin (VANCOCIN) intermittent dosing (placeholder)   Other RX Placeholder    vancomycin  250 mg Oral 4 times per day      dextrose       ondansetron, ipratropium-albuterol, glucose, dextrose, glucagon (rDNA), dextrose, acetaminophen    Lab Data:  CBC:   Recent Labs     08/15/19  0426 08/16/19  0258 08/17/19  0157   WBC 21.6* 20.7* 16.2*   HGB 8.2* 8.2* 9.4*   HCT 26.0* 26.9* 32.6*   .6* 102.7* 107.9*    259 344     BMP:   Recent Labs     08/15/19  0426 08/16/19  0258 08/17/19  0157   * 132* 137   K 3.3* 4.0 4.0   CL 94* 97* 98*   CO2 22 20* 24   BUN 32* 40* 16   CREATININE 5.7* 6.4* 3.5*     PT/INR: No results for input(s): PROTIME, INR in the last 72 hours. BNP:  No results for input(s): PROBNP in the last 72 hours. TROPONIN:   Recent Labs     08/15/19  0426 08/15/19  1032 08/15/19  1707   TROPONINT 0.313* 0.291* 0.290*        ECHO :   echocardiogram    Assessment:  71 y. o.year old who is admitted forNo chief complaint on file. , active issues as noted below:  Impression:  Active Problems:    Pneumonia    Leukocytosis    Troponin level elevated    Hypertension    Endocarditis  Resolved Problems:    * No resolved hospital problems. *            All labs, medications and tests reviewed by myself , continue all other medications of all above medical condition listed as is except for changes mentioned above. Thank you very much for consult , please call with questions.     Rocio French MD 8/17/2019 2:42 PM

## 2019-08-18 LAB
ALBUMIN SERPL-MCNC: 2.5 GM/DL (ref 3.4–5)
ALP BLD-CCNC: 76 IU/L (ref 40–128)
ALT SERPL-CCNC: 7 U/L (ref 10–40)
ANION GAP SERPL CALCULATED.3IONS-SCNC: 13 MMOL/L (ref 4–16)
AST SERPL-CCNC: 10 IU/L (ref 15–37)
BASOPHILS ABSOLUTE: 0.1 K/CU MM
BASOPHILS RELATIVE PERCENT: 0.6 % (ref 0–1)
BILIRUB SERPL-MCNC: 0.2 MG/DL (ref 0–1)
BUN BLDV-MCNC: 19 MG/DL (ref 6–23)
CALCIUM SERPL-MCNC: 8 MG/DL (ref 8.3–10.6)
CHLORIDE BLD-SCNC: 99 MMOL/L (ref 99–110)
CO2: 23 MMOL/L (ref 21–32)
CREAT SERPL-MCNC: 4.3 MG/DL (ref 0.6–1.1)
CULTURE: ABNORMAL
CULTURE: ABNORMAL
DIFFERENTIAL TYPE: ABNORMAL
EOSINOPHILS ABSOLUTE: 0.1 K/CU MM
EOSINOPHILS RELATIVE PERCENT: 1.1 % (ref 0–3)
GFR AFRICAN AMERICAN: 13 ML/MIN/1.73M2
GFR NON-AFRICAN AMERICAN: 10 ML/MIN/1.73M2
GLUCOSE BLD-MCNC: 100 MG/DL (ref 70–99)
GLUCOSE BLD-MCNC: 102 MG/DL (ref 70–99)
GLUCOSE BLD-MCNC: 105 MG/DL (ref 70–99)
GLUCOSE BLD-MCNC: 119 MG/DL (ref 70–99)
GLUCOSE BLD-MCNC: 127 MG/DL (ref 70–99)
HCT VFR BLD CALC: 31.3 % (ref 37–47)
HEMOGLOBIN: 9.5 GM/DL (ref 12.5–16)
IMMATURE NEUTROPHIL %: 1.5 % (ref 0–0.43)
LACTATE: 0.5 MMOL/L (ref 0.4–2)
LYMPHOCYTES ABSOLUTE: 1.2 K/CU MM
LYMPHOCYTES RELATIVE PERCENT: 11.2 % (ref 24–44)
Lab: ABNORMAL
MAGNESIUM: 1.9 MG/DL (ref 1.8–2.4)
MCH RBC QN AUTO: 30.9 PG (ref 27–31)
MCHC RBC AUTO-ENTMCNC: 30.4 % (ref 32–36)
MCV RBC AUTO: 102 FL (ref 78–100)
MONOCYTES ABSOLUTE: 0.7 K/CU MM
MONOCYTES RELATIVE PERCENT: 6.5 % (ref 0–4)
NUCLEATED RBC %: 0 %
PDW BLD-RTO: 15.9 % (ref 11.7–14.9)
PHOSPHORUS: 4.5 MG/DL (ref 2.5–4.9)
PLATELET # BLD: 390 K/CU MM (ref 140–440)
PMV BLD AUTO: 9.5 FL (ref 7.5–11.1)
POTASSIUM SERPL-SCNC: ABNORMAL MMOL/L (ref 3.5–5.1)
PROCALCITONIN: 6.43
RBC # BLD: 3.07 M/CU MM (ref 4.2–5.4)
SEGMENTED NEUTROPHILS ABSOLUTE COUNT: 8.6 K/CU MM
SEGMENTED NEUTROPHILS RELATIVE PERCENT: 79.1 % (ref 36–66)
SODIUM BLD-SCNC: 135 MMOL/L (ref 135–145)
SPECIMEN: ABNORMAL
TOTAL IMMATURE NEUTOROPHIL: 0.16 K/CU MM
TOTAL NUCLEATED RBC: 0 K/CU MM
TOTAL PROTEIN: 4.4 GM/DL (ref 6.4–8.2)
WBC # BLD: 10.9 K/CU MM (ref 4–10.5)

## 2019-08-18 PROCEDURE — 83605 ASSAY OF LACTIC ACID: CPT

## 2019-08-18 PROCEDURE — 82962 GLUCOSE BLOOD TEST: CPT

## 2019-08-18 PROCEDURE — 6370000000 HC RX 637 (ALT 250 FOR IP): Performed by: INTERNAL MEDICINE

## 2019-08-18 PROCEDURE — 6360000002 HC RX W HCPCS: Performed by: NURSE PRACTITIONER

## 2019-08-18 PROCEDURE — 36415 COLL VENOUS BLD VENIPUNCTURE: CPT

## 2019-08-18 PROCEDURE — 85025 COMPLETE CBC W/AUTO DIFF WBC: CPT

## 2019-08-18 PROCEDURE — 99231 SBSQ HOSP IP/OBS SF/LOW 25: CPT | Performed by: INTERNAL MEDICINE

## 2019-08-18 PROCEDURE — 6360000002 HC RX W HCPCS: Performed by: INTERNAL MEDICINE

## 2019-08-18 PROCEDURE — 2500000003 HC RX 250 WO HCPCS: Performed by: HOSPITALIST

## 2019-08-18 PROCEDURE — 85027 COMPLETE CBC AUTOMATED: CPT

## 2019-08-18 PROCEDURE — 94761 N-INVAS EAR/PLS OXIMETRY MLT: CPT

## 2019-08-18 PROCEDURE — 84145 PROCALCITONIN (PCT): CPT

## 2019-08-18 PROCEDURE — 2140000000 HC CCU INTERMEDIATE R&B

## 2019-08-18 PROCEDURE — 80053 COMPREHEN METABOLIC PANEL: CPT

## 2019-08-18 PROCEDURE — 83735 ASSAY OF MAGNESIUM: CPT

## 2019-08-18 PROCEDURE — 84100 ASSAY OF PHOSPHORUS: CPT

## 2019-08-18 PROCEDURE — 2580000003 HC RX 258

## 2019-08-18 PROCEDURE — 2580000003 HC RX 258: Performed by: HOSPITALIST

## 2019-08-18 RX ORDER — POTASSIUM CHLORIDE 7.45 MG/ML
40 INJECTION INTRAVENOUS ONCE
Status: DISCONTINUED | OUTPATIENT
Start: 2019-08-18 | End: 2019-08-18 | Stop reason: CLARIF

## 2019-08-18 RX ORDER — POTASSIUM CHLORIDE 7.45 MG/ML
10 INJECTION INTRAVENOUS ONCE
Status: COMPLETED | OUTPATIENT
Start: 2019-08-18 | End: 2019-08-18

## 2019-08-18 RX ORDER — SODIUM CHLORIDE 9 MG/ML
INJECTION, SOLUTION INTRAVENOUS
Status: COMPLETED
Start: 2019-08-18 | End: 2019-08-18

## 2019-08-18 RX ADMIN — FUROSEMIDE 80 MG: 40 TABLET ORAL at 08:42

## 2019-08-18 RX ADMIN — POTASSIUM CHLORIDE 10 MEQ: 10 INJECTION, SOLUTION INTRAVENOUS at 11:36

## 2019-08-18 RX ADMIN — HEPARIN SODIUM 5000 UNITS: 5000 INJECTION INTRAVENOUS; SUBCUTANEOUS at 21:06

## 2019-08-18 RX ADMIN — NAFCILLIN SODIUM: 2 INJECTION, POWDER, LYOPHILIZED, FOR SOLUTION INTRAMUSCULAR; INTRAVENOUS at 17:50

## 2019-08-18 RX ADMIN — SEVELAMER CARBONATE 800 MG: 800 TABLET, FILM COATED ORAL at 08:42

## 2019-08-18 RX ADMIN — HYDRALAZINE HYDROCHLORIDE 50 MG: 50 TABLET, FILM COATED ORAL at 13:19

## 2019-08-18 RX ADMIN — HEPARIN SODIUM 5000 UNITS: 5000 INJECTION INTRAVENOUS; SUBCUTANEOUS at 04:30

## 2019-08-18 RX ADMIN — Medication 250 MG: at 17:50

## 2019-08-18 RX ADMIN — Medication 250 MG: at 05:27

## 2019-08-18 RX ADMIN — NAFCILLIN SODIUM: 2 INJECTION, POWDER, LYOPHILIZED, FOR SOLUTION INTRAMUSCULAR; INTRAVENOUS at 02:04

## 2019-08-18 RX ADMIN — Medication 250 MG: at 13:19

## 2019-08-18 RX ADMIN — NAFCILLIN SODIUM: 2 INJECTION, POWDER, LYOPHILIZED, FOR SOLUTION INTRAMUSCULAR; INTRAVENOUS at 05:28

## 2019-08-18 RX ADMIN — GEMFIBROZIL 600 MG: 600 TABLET ORAL at 08:42

## 2019-08-18 RX ADMIN — POTASSIUM CHLORIDE 10 MEQ: 7.46 INJECTION, SOLUTION INTRAVENOUS at 10:46

## 2019-08-18 RX ADMIN — NAFCILLIN SODIUM: 2 INJECTION, POWDER, LYOPHILIZED, FOR SOLUTION INTRAMUSCULAR; INTRAVENOUS at 21:13

## 2019-08-18 RX ADMIN — HYDRALAZINE HYDROCHLORIDE 50 MG: 50 TABLET, FILM COATED ORAL at 08:45

## 2019-08-18 RX ADMIN — SODIUM CHLORIDE: 9 INJECTION, SOLUTION INTRAVENOUS at 10:46

## 2019-08-18 RX ADMIN — Medication 250 MG: at 00:41

## 2019-08-18 RX ADMIN — POTASSIUM CHLORIDE 10 MEQ: 7.46 INJECTION, SOLUTION INTRAVENOUS at 06:37

## 2019-08-18 RX ADMIN — ALLOPURINOL 100 MG: 100 TABLET ORAL at 08:42

## 2019-08-18 RX ADMIN — HEPARIN SODIUM 5000 UNITS: 5000 INJECTION INTRAVENOUS; SUBCUTANEOUS at 13:19

## 2019-08-18 RX ADMIN — POTASSIUM CHLORIDE 10 MEQ: 10 INJECTION, SOLUTION INTRAVENOUS at 12:41

## 2019-08-18 RX ADMIN — NAFCILLIN SODIUM: 2 INJECTION, POWDER, LYOPHILIZED, FOR SOLUTION INTRAMUSCULAR; INTRAVENOUS at 13:20

## 2019-08-18 RX ADMIN — HYDRALAZINE HYDROCHLORIDE 50 MG: 50 TABLET, FILM COATED ORAL at 21:07

## 2019-08-18 ASSESSMENT — PAIN SCALES - GENERAL: PAINLEVEL_OUTOF10: 0

## 2019-08-18 NOTE — PROGRESS NOTES
indicated    VANCOMYCIN RANDOM SCHEDULED FOR 8/19 @06:00    Thank you for the consult. Clara Ramos  8/18/2019 5:02 PM

## 2019-08-18 NOTE — PROGRESS NOTES
staph aureus. On vanc, pharmacy to dose     and nafcillin. ID consulted. TTE showed tricuspid valve vegetation, may also have aortic valve vegetation. NPO for possible KAYLIE Monday, will d/w ID and cardio,  Repeat blood cx 8/17 still positive, patient had MRSA bacteremia back in July. Daughter states patient had a catheter at that time which was removed. CT chest with possible septic embolic and RLL pneumonia, will await ID recs, repeat blood cx tomorrow, no clinical symptoms of pneumonia  2. Cdiff Diarrhea  -on oral vancomycin, improved  3. NSTEMI  -likely type 2 from demand and ESRD, cardio following, stress test negative  4. DM type 2 controlled, ADA diet  -SSI  5. ESRD  -HD as per nephro  Anemia  -likely due to ESRD. Iron studies c/w chronic disease, no bleeding clinically  7. HTN: stable  8. Hypokalemia--d/t diarrhea, replace with IV, repeat K and mag tomorrow  9.  Hyponatremia at admission--Na 128 at admission, due to ESRD on HD, pneumonia, septic embolic, Na improved to 757, normal today 135, monitor Na, I/o, neph consulted, improved, resolved            PT/OT Eval Status:deferred    DVT Prophylaxis: SQ heparin  Diet: DIET GENERAL; No Added Salt (3-4 GM)  Diet NPO, After Midnight  Code Status: Full Code          Eliseo Ortiz MD

## 2019-08-19 LAB
ACQUISITION DURATION: NORMAL S
ANION GAP SERPL CALCULATED.3IONS-SCNC: 16 MMOL/L (ref 4–16)
AVERAGE HEART RATE: 86 BPM
BASOPHILS ABSOLUTE: 0.1 K/CU MM
BASOPHILS RELATIVE PERCENT: 1 % (ref 0–1)
BUN BLDV-MCNC: 21 MG/DL (ref 6–23)
CALCIUM SERPL-MCNC: 9 MG/DL (ref 8.3–10.6)
CHLORIDE BLD-SCNC: 100 MMOL/L (ref 99–110)
CO2: 21 MMOL/L (ref 21–32)
CREAT SERPL-MCNC: 4.9 MG/DL (ref 0.6–1.1)
CULTURE: ABNORMAL
CULTURE: ABNORMAL
DIFFERENTIAL TYPE: ABNORMAL
DOSE AMOUNT: NORMAL
DOSE TIME: NORMAL
EKG DIAGNOSIS: NORMAL
EOSINOPHILS ABSOLUTE: 0.2 K/CU MM
EOSINOPHILS RELATIVE PERCENT: 1.6 % (ref 0–3)
GFR AFRICAN AMERICAN: 11 ML/MIN/1.73M2
GFR NON-AFRICAN AMERICAN: 9 ML/MIN/1.73M2
GLUCOSE BLD-MCNC: 106 MG/DL (ref 70–99)
GLUCOSE BLD-MCNC: 109 MG/DL (ref 70–99)
GLUCOSE BLD-MCNC: 172 MG/DL (ref 70–99)
GLUCOSE BLD-MCNC: 93 MG/DL (ref 70–99)
HCT VFR BLD CALC: 36.9 % (ref 37–47)
HEMOGLOBIN: 11.2 GM/DL (ref 12.5–16)
HOOKUP DATE: NORMAL
HOOKUP TIME: NORMAL
IMMATURE NEUTROPHIL %: 2.2 % (ref 0–0.43)
LYMPHOCYTES ABSOLUTE: 1.6 K/CU MM
LYMPHOCYTES RELATIVE PERCENT: 15.1 % (ref 24–44)
Lab: ABNORMAL
MAGNESIUM: 2.1 MG/DL (ref 1.8–2.4)
MAX HEART RATE TIME/DATE: NORMAL
MAX HEART RATE: 133 BPM
MCH RBC QN AUTO: 30.4 PG (ref 27–31)
MCHC RBC AUTO-ENTMCNC: 30.4 % (ref 32–36)
MCV RBC AUTO: 100.3 FL (ref 78–100)
MIN HEART RATE TIME/DATE: NORMAL
MIN HEART RATE: 65 BPM
MONOCYTES ABSOLUTE: 0.8 K/CU MM
MONOCYTES RELATIVE PERCENT: 7.3 % (ref 0–4)
NUCLEATED RBC %: 0.2 %
NUMBER OF QRS COMPLEXES: NORMAL
NUMBER OF SUPRAVENTRICULAR BEATS IN RUNS: 0
NUMBER OF SUPRAVENTRICULAR COUPLETS: 0
NUMBER OF SUPRAVENTRICULAR ECTOPICS: 1594
NUMBER OF SUPRAVENTRICULAR ISOLATED BEATS: 1595
NUMBER OF SUPRAVENTRICULAR RUNS: 0
NUMBER OF VENTRICULAR BEATS IN RUNS: 0
NUMBER OF VENTRICULAR BIGEMINAL CYCLES: 0
NUMBER OF VENTRICULAR COUPLETS: 1
NUMBER OF VENTRICULAR ECTOPICS: 5
NUMBER OF VENTRICULAR ISOLATED BEATS: 3
NUMBER OF VENTRICULAR RUNS: 0
PDW BLD-RTO: 15.9 % (ref 11.7–14.9)
PLATELET # BLD: 567 K/CU MM (ref 140–440)
PMV BLD AUTO: 9.2 FL (ref 7.5–11.1)
POTASSIUM SERPL-SCNC: 3.2 MMOL/L (ref 3.5–5.1)
RBC # BLD: 3.68 M/CU MM (ref 4.2–5.4)
SEGMENTED NEUTROPHILS ABSOLUTE COUNT: 7.5 K/CU MM
SEGMENTED NEUTROPHILS RELATIVE PERCENT: 72.8 % (ref 36–66)
SODIUM BLD-SCNC: 137 MMOL/L (ref 135–145)
SPECIMEN: ABNORMAL
TOTAL IMMATURE NEUTOROPHIL: 0.23 K/CU MM
TOTAL NUCLEATED RBC: 0 K/CU MM
VANCOMYCIN RANDOM: 20.9 UG/ML
VANCOMYCIN RANDOM: NORMAL UG/ML
WBC # BLD: 10.4 K/CU MM (ref 4–10.5)

## 2019-08-19 PROCEDURE — 80048 BASIC METABOLIC PNL TOTAL CA: CPT

## 2019-08-19 PROCEDURE — 87147 CULTURE TYPE IMMUNOLOGIC: CPT

## 2019-08-19 PROCEDURE — 6370000000 HC RX 637 (ALT 250 FOR IP): Performed by: INTERNAL MEDICINE

## 2019-08-19 PROCEDURE — 2580000003 HC RX 258: Performed by: HOSPITALIST

## 2019-08-19 PROCEDURE — 2140000000 HC CCU INTERMEDIATE R&B

## 2019-08-19 PROCEDURE — 94761 N-INVAS EAR/PLS OXIMETRY MLT: CPT

## 2019-08-19 PROCEDURE — 83735 ASSAY OF MAGNESIUM: CPT

## 2019-08-19 PROCEDURE — 99223 1ST HOSP IP/OBS HIGH 75: CPT | Performed by: INTERNAL MEDICINE

## 2019-08-19 PROCEDURE — 2500000003 HC RX 250 WO HCPCS: Performed by: HOSPITALIST

## 2019-08-19 PROCEDURE — 6360000002 HC RX W HCPCS: Performed by: INTERNAL MEDICINE

## 2019-08-19 PROCEDURE — 82962 GLUCOSE BLOOD TEST: CPT

## 2019-08-19 PROCEDURE — 80202 ASSAY OF VANCOMYCIN: CPT

## 2019-08-19 PROCEDURE — 90935 HEMODIALYSIS ONE EVALUATION: CPT

## 2019-08-19 PROCEDURE — 87077 CULTURE AEROBIC IDENTIFY: CPT

## 2019-08-19 PROCEDURE — 87040 BLOOD CULTURE FOR BACTERIA: CPT

## 2019-08-19 PROCEDURE — 36415 COLL VENOUS BLD VENIPUNCTURE: CPT

## 2019-08-19 PROCEDURE — 85025 COMPLETE CBC W/AUTO DIFF WBC: CPT

## 2019-08-19 RX ORDER — LIDOCAINE AND PRILOCAINE 25; 25 MG/G; MG/G
CREAM TOPICAL
Status: COMPLETED | OUTPATIENT
Start: 2019-08-19 | End: 2019-08-19

## 2019-08-19 RX ADMIN — NAFCILLIN SODIUM: 2 INJECTION, POWDER, LYOPHILIZED, FOR SOLUTION INTRAMUSCULAR; INTRAVENOUS at 06:01

## 2019-08-19 RX ADMIN — NAFCILLIN SODIUM: 2 INJECTION, POWDER, LYOPHILIZED, FOR SOLUTION INTRAMUSCULAR; INTRAVENOUS at 02:38

## 2019-08-19 RX ADMIN — Medication 250 MG: at 11:11

## 2019-08-19 RX ADMIN — NAFCILLIN SODIUM: 2 INJECTION, POWDER, LYOPHILIZED, FOR SOLUTION INTRAMUSCULAR; INTRAVENOUS at 23:06

## 2019-08-19 RX ADMIN — INSULIN LISPRO 1 UNITS: 100 INJECTION, SOLUTION INTRAVENOUS; SUBCUTANEOUS at 22:56

## 2019-08-19 RX ADMIN — LIDOCAINE AND PRILOCAINE: 25; 25 CREAM TOPICAL at 15:09

## 2019-08-19 RX ADMIN — HEPARIN SODIUM 5000 UNITS: 5000 INJECTION INTRAVENOUS; SUBCUTANEOUS at 22:57

## 2019-08-19 RX ADMIN — IRON SUCROSE 100 MG: 20 INJECTION, SOLUTION INTRAVENOUS at 19:57

## 2019-08-19 RX ADMIN — HYDRALAZINE HYDROCHLORIDE 50 MG: 50 TABLET, FILM COATED ORAL at 22:57

## 2019-08-19 RX ADMIN — Medication 250 MG: at 00:52

## 2019-08-19 RX ADMIN — FUROSEMIDE 80 MG: 40 TABLET ORAL at 11:11

## 2019-08-19 RX ADMIN — ALLOPURINOL 100 MG: 100 TABLET ORAL at 11:11

## 2019-08-19 RX ADMIN — NAFCILLIN SODIUM: 2 INJECTION, POWDER, LYOPHILIZED, FOR SOLUTION INTRAMUSCULAR; INTRAVENOUS at 11:11

## 2019-08-19 ASSESSMENT — PAIN SCALES - GENERAL
PAINLEVEL_OUTOF10: 0

## 2019-08-19 NOTE — CARE COORDINATION
[address] Transitions Interview     2019    Patient: Ying Stallworth Patient : 1955   MRN: [id number]  Reason for Admission:   fatigue  RARS: Readmission Risk Score: 15         Spoke with:   patient      Readmission Risk  Patient Active Problem List   Diagnosis    Diabetes 1.5, managed as type 1 ([facility] [address]    Sinus bradycardia    Renal insufficiency    Pneumonia    Leukocytosis    Troponin level elevated    Hypertension    Endocarditis       Inpatient Assessment  Care Transitions Summary    Care Transitions Inpatient Review  Medication Review  Are you able to afford your medications?:  Yes  How often do you have difficulty taking your medications?:  I always take them as prescribed. Housing Review  Who do you live with?:  Child  Are you an active caregiver in your home?:  No  Social Support  Do you have a ?:  No  Do you have a [facility]?:  No  Durable Medical Equipment  Patient DME:  Kimmy gilmore  Functional Review  Ability to seek help/take action for Emergent/Urgent situations i.e. fire, crime, inclement weather or health crisis. :  Independent  Ability handle personal hygiene needs (bathing/dressing/grooming): Independent  Ability to manage medications:  Needs Assistance  Ability to prepare food:  Needs Assistance  Ability to maintain home (clean home, laundry):  Needs Assistance  Ability to drive and/or has transportation:  Needs Assistance  Ability to do shopping:  Needs Assistance  Ability to manage finances: Independent  Is patient able to live independently?:  Yes  Hearing and Vision  Visual Impairment:  Reading glasses  Hearing Impairment:  Hard of hearing  Care Transitions Interventions         Follow Up:  Spoke with patient with no family present. Oriented to the role of Care Transition/ BPCI with letter/ business card given. Patient is very [address]. Reports that she does not have hearing aides. Patient has a cane and a RW.   Reports

## 2019-08-19 NOTE — CARE COORDINATION
Care Transition nurse/Armando Aguilar notified CM that she met with pt this am and pt still plans to go home with her daughter and continued to deny any needs. CM will continue to follow.   TE

## 2019-08-19 NOTE — PROGRESS NOTES
Nephrology Progress Note        [address], [address] [facility], [place] [address]  Phone: [phone]  Office Hours: 8:30AM - 4:30PM  Monday - Friday       [facility]  MD Juany Moore, DO [address],  [address]  [address] [facility]  PHONE: [phone]  FAX: [phone]    8/19/2019 8:39 AM  Subjective:   Admit Date: 8/15/2019  PCP: Cricket Rodriguez CNP  Interval History: continues to have diarrhea    Diet: Diet NPO, After Midnight      Data:   Scheduled Meds:   lidocaine-prilocaine   Topical Once in dialysis    iron sucrose  100 mg Intravenous Once in dialysis    IVPB builder   Intravenous Q4H    gemfibrozil  600 mg Oral BID AC    allopurinol  100 mg Oral Daily    hydrALAZINE  50 mg Oral TID    heparin (porcine)  5,000 Units Subcutaneous Q8H    insulin lispro  0-12 Units Subcutaneous TID WC    insulin lispro  0-6 Units Subcutaneous Nightly    darbepoetin dustin-polysorbate  100 mcg Subcutaneous Weekly    furosemide  80 mg Oral Daily    vancomycin (VANCOCIN) intermittent dosing (placeholder)   Other RX Placeholder    vancomycin  250 mg Oral 4 times per day     Continuous Infusions:   dextrose       PRN Meds:ondansetron, ipratropium-albuterol, glucose, dextrose, glucagon (rDNA), dextrose, acetaminophen  I/O last 3 completed shifts:   In: 320 [P.O.:120; IV Piggyback:200]  Out: -   I/O this shift:  In: 100 [IV Piggyback:100]  Out: -     Intake/Output Summary (Last 24 hours) at 8/19/2019 0839  Last data filed at 8/19/2019 0701  Gross per 24 hour   Intake 420 ml   Output --   Net 420 ml       CBC:   Recent Labs     08/17/19  0157 08/18/19  0409 08/19/19  0513   WBC 16.2* 10.9* 10.4   HGB 9.4* 9.5* 11.2*    390 567*       BMP:    Recent Labs     08/17/19  0157 08/18/19  0409 08/19/19  0513    135 137   K 4.0 3.0  K CALLED TO DURAN VALENTINE,RN8/18 0531 BY NICOLE,MT  * 3.2*   CL 98* 99 100   CO2 24 23 21   BUN 16 19 21   CREATININE 3.5* 4.3* 4.9*   GLUCOSE 126* 119* 106*     Hepatic:   Recent Labs     08/18/19  0409   AST 10*   ALT 7*   BILITOT 0.2   ALKPHOS 76     Troponin: No results for input(s): TROPONINI in the last 72 hours. BNP: No results for input(s): BNP in the last 72 hours. Lipids: No results for input(s): CHOL, HDL in the last 72 hours. Invalid input(s): LDLCALCU  ABGs: No results found for: PHART, PO2ART, DXC3PZG  INR: No results for input(s): INR in the last 72 hours.     Objective:   Vitals: BP (!) 155/49   Pulse 87   Temp 98.7 °F (37.1 °C) (Oral)   Resp 22   Ht 4' 11\" (1.499 m)   Wt 131 lb 12.8 oz (59.8 kg)   LMP 01/07/2009   SpO2 96%   BMI 26.62 kg/m²   General appearance: alert and cooperative with exam, in no acute distress  HEENT: normocephalic, atraumatic,   Neck: supple, trachea midline  Lungs:  breathing comfortably on room air  Heart[de-identified] regular rate and rhythm,   Abdomen: soft, non-tender; non distended,   Extremities: extremities atraumatic, no cyanosis or edema  Neurologic: alert, oriented, follows commands, interactive    Assessment and Plan:   - Staph endocarditis with TV vegetations  - c diarrhea  - esrd on hd MF as outpt  - htn  - dm2    Plan;  - Planning HD today  - upon dc, Will need an abx that can be dosed for HD so that she can receive it on HD, might have to make her HD schedule MWF so that she can get the abx as outpt appropriately                      Electronically signed by Mateo Tejada DO on 8/19/2019 at 8:39 AM    [address],  [address] [facility], [place] [address]  PHONE: [phone]  FAX: [phone]

## 2019-08-19 NOTE — PROGRESS NOTES
Hospitalist Progress Note      PCP: Steve Marroquin CNP    Date of Admission: 8/15/2019    Chief Complaint on Admission: fevers    Pt Seen/Examined and Chart Reviewed. Admitting dx bacteremia    SUBJECTIVE:     No fevers, tolerating PO,  no pain at AVF site      OBJECTIVE:   Vitals    TEMPERATURE:  Current - Temp: 98.5 °F (36.9 °C); Max - Temp  Av.3 °F (36.8 °C)  Min: 97.8 °F (36.6 °C)  Max: 98.7 °F (37.1 °C)  RESPIRATIONS RANGE: Resp  Av  Min: 13  Max: 22  PULSE RANGE: Pulse  Av.3  Min: 84  Max: 91  BLOOD PRESSURE RANGE:  Systolic (04JHR), SWQ:260 , Min:155 , ZRS:608   ; Diastolic (91VGC), ZMM:05, Min:49, Max:71    PULSE OXIMETRY RANGE: SpO2  Av %  Min: 96 %  Max: 96 %  24HR INTAKE/OUTPUT:      Intake/Output Summary (Last 24 hours) at 2019 1539  Last data filed at 2019 1300  Gross per 24 hour   Intake 710 ml   Output --   Net 710 ml       Exam:    General appearance: Well, no apparent distress, appears stated age and cooperative. HEENT Normal cephalic, atraumatic without obvious deformity. Pupils equal, round, and reactive to light. Extra ocular muscles intact. Conjunctivae/corneas clear. Neck: Supple, No jugular venous distention/bruits. Trachea midline without thyromegaly or adenopathy with full range of motion. Lungs: Clear to ascultation, bilaterally without Rales/Wheezes/Rhonchi with good respiratory effort. Heart: Regular rate and rhythm with Normal S1/S2 without  murmurs, rubs or gallops, point of maximum impulse non-displaced  Abdomen: Soft, non-tender or non-distended without rigidity or guarding and positive bowel sounds all four quadrants. Extremities: No clubbing, cyanosis, or edema bilaterally. Full range of motion without deformity and normal gait intact. Skin: Skin color, texture, turgor normal. No rashes or lesions.  RUE AVF  Neurologic: Alert and oriented X 3,  neurovascularly intact with sensory/motor intact upper extremities/lower CT chest with possible septic embolic and RLL pneumonia, will await ID recs, no clinical symptoms of pneumonia, may not need KAYLIE as TV endocarditis seen on surface echo, await ID recs, continue IV nafcillin    2. Cdiff colitis  -on oral vancomycin, improved  3. NSTEMI  -likely type 2 from demand and ESRD, cardio following, stress test negative  4. DM type 2 controlled, a1c 4.3  -SSI, regular diet per pt requests  5. ESRD  -HD as per nephro, HD M/F  Anemia  -likely due to ESRD. Iron studies c/w chronic disease, no bleeding clinically  7. HTN: stable  8.  Hypokalemia--d/t diarrhea, replace with IV, repeat K and mag tomorrow    PT/OT Eval Status:ordered    DVT Prophylaxis: SQ heparin  Diet: DIET GENERAL;  Code Status: Full Code    dispo--await ID recs, CM following      Veronica Cavazos MD

## 2019-08-20 ENCOUNTER — CARE COORDINATION (OUTPATIENT)
Dept: CARE COORDINATION | Age: 64
End: 2019-08-20

## 2019-08-20 LAB
ANION GAP SERPL CALCULATED.3IONS-SCNC: 12 MMOL/L (ref 4–16)
BASOPHILS ABSOLUTE: 0.1 K/CU MM
BASOPHILS RELATIVE PERCENT: 0.8 % (ref 0–1)
BUN BLDV-MCNC: 9 MG/DL (ref 6–23)
CALCIUM SERPL-MCNC: 8.1 MG/DL (ref 8.3–10.6)
CHLORIDE BLD-SCNC: 102 MMOL/L (ref 99–110)
CO2: 25 MMOL/L (ref 21–32)
CREAT SERPL-MCNC: 2.8 MG/DL (ref 0.6–1.1)
DIFFERENTIAL TYPE: ABNORMAL
EOSINOPHILS ABSOLUTE: 0.1 K/CU MM
EOSINOPHILS RELATIVE PERCENT: 1 % (ref 0–3)
GFR AFRICAN AMERICAN: 21 ML/MIN/1.73M2
GFR NON-AFRICAN AMERICAN: 17 ML/MIN/1.73M2
GLUCOSE BLD-MCNC: 112 MG/DL (ref 70–99)
GLUCOSE BLD-MCNC: 128 MG/DL (ref 70–99)
GLUCOSE BLD-MCNC: 155 MG/DL (ref 70–99)
GLUCOSE BLD-MCNC: 189 MG/DL (ref 70–99)
GLUCOSE BLD-MCNC: 210 MG/DL (ref 70–99)
HCT VFR BLD CALC: 34 % (ref 37–47)
HEMOGLOBIN: 10.3 GM/DL (ref 12.5–16)
IMMATURE NEUTROPHIL %: 2.1 % (ref 0–0.43)
LYMPHOCYTES ABSOLUTE: 1.2 K/CU MM
LYMPHOCYTES RELATIVE PERCENT: 14.2 % (ref 24–44)
MAGNESIUM: 1.9 MG/DL (ref 1.8–2.4)
MCH RBC QN AUTO: 31.1 PG (ref 27–31)
MCHC RBC AUTO-ENTMCNC: 30.3 % (ref 32–36)
MCV RBC AUTO: 102.7 FL (ref 78–100)
MONOCYTES ABSOLUTE: 0.9 K/CU MM
MONOCYTES RELATIVE PERCENT: 10.4 % (ref 0–4)
NUCLEATED RBC %: 0 %
PDW BLD-RTO: 16.6 % (ref 11.7–14.9)
PLATELET # BLD: 454 K/CU MM (ref 140–440)
PMV BLD AUTO: 9.5 FL (ref 7.5–11.1)
POTASSIUM SERPL-SCNC: 3.3 MMOL/L (ref 3.5–5.1)
RBC # BLD: 3.31 M/CU MM (ref 4.2–5.4)
SEGMENTED NEUTROPHILS ABSOLUTE COUNT: 6.3 K/CU MM
SEGMENTED NEUTROPHILS RELATIVE PERCENT: 71.5 % (ref 36–66)
SODIUM BLD-SCNC: 139 MMOL/L (ref 135–145)
TOTAL IMMATURE NEUTOROPHIL: 0.18 K/CU MM
TOTAL NUCLEATED RBC: 0 K/CU MM
WBC # BLD: 8.8 K/CU MM (ref 4–10.5)

## 2019-08-20 PROCEDURE — 83735 ASSAY OF MAGNESIUM: CPT

## 2019-08-20 PROCEDURE — 6370000000 HC RX 637 (ALT 250 FOR IP): Performed by: INTERNAL MEDICINE

## 2019-08-20 PROCEDURE — 2500000003 HC RX 250 WO HCPCS: Performed by: HOSPITALIST

## 2019-08-20 PROCEDURE — 99232 SBSQ HOSP IP/OBS MODERATE 35: CPT | Performed by: INTERNAL MEDICINE

## 2019-08-20 PROCEDURE — 82962 GLUCOSE BLOOD TEST: CPT

## 2019-08-20 PROCEDURE — 36415 COLL VENOUS BLD VENIPUNCTURE: CPT

## 2019-08-20 PROCEDURE — 6360000002 HC RX W HCPCS: Performed by: INTERNAL MEDICINE

## 2019-08-20 PROCEDURE — 2580000003 HC RX 258: Performed by: INTERNAL MEDICINE

## 2019-08-20 PROCEDURE — 80048 BASIC METABOLIC PNL TOTAL CA: CPT

## 2019-08-20 PROCEDURE — 2140000000 HC CCU INTERMEDIATE R&B

## 2019-08-20 PROCEDURE — 2580000003 HC RX 258: Performed by: HOSPITALIST

## 2019-08-20 PROCEDURE — 2500000003 HC RX 250 WO HCPCS: Performed by: INTERNAL MEDICINE

## 2019-08-20 PROCEDURE — 85025 COMPLETE CBC W/AUTO DIFF WBC: CPT

## 2019-08-20 PROCEDURE — 94761 N-INVAS EAR/PLS OXIMETRY MLT: CPT

## 2019-08-20 RX ORDER — POTASSIUM CHLORIDE 1.5 G/1.77G
40 POWDER, FOR SOLUTION ORAL ONCE
Status: COMPLETED | OUTPATIENT
Start: 2019-08-20 | End: 2019-08-20

## 2019-08-20 RX ADMIN — Medication 250 MG: at 17:40

## 2019-08-20 RX ADMIN — HYDRALAZINE HYDROCHLORIDE 50 MG: 50 TABLET, FILM COATED ORAL at 14:46

## 2019-08-20 RX ADMIN — HEPARIN SODIUM 5000 UNITS: 5000 INJECTION INTRAVENOUS; SUBCUTANEOUS at 12:11

## 2019-08-20 RX ADMIN — GEMFIBROZIL 600 MG: 600 TABLET ORAL at 14:46

## 2019-08-20 RX ADMIN — INSULIN LISPRO 2 UNITS: 100 INJECTION, SOLUTION INTRAVENOUS; SUBCUTANEOUS at 08:12

## 2019-08-20 RX ADMIN — HEPARIN SODIUM 5000 UNITS: 5000 INJECTION INTRAVENOUS; SUBCUTANEOUS at 22:39

## 2019-08-20 RX ADMIN — ALLOPURINOL 100 MG: 100 TABLET ORAL at 08:12

## 2019-08-20 RX ADMIN — HYDRALAZINE HYDROCHLORIDE 50 MG: 50 TABLET, FILM COATED ORAL at 08:12

## 2019-08-20 RX ADMIN — NAFCILLIN SODIUM: 2 INJECTION, POWDER, LYOPHILIZED, FOR SOLUTION INTRAMUSCULAR; INTRAVENOUS at 05:26

## 2019-08-20 RX ADMIN — POTASSIUM CHLORIDE 40 MEQ: 1.5 POWDER, FOR SOLUTION ORAL at 06:28

## 2019-08-20 RX ADMIN — Medication 250 MG: at 05:26

## 2019-08-20 RX ADMIN — GEMFIBROZIL 600 MG: 600 TABLET ORAL at 08:12

## 2019-08-20 RX ADMIN — Medication 250 MG: at 01:16

## 2019-08-20 RX ADMIN — HYDRALAZINE HYDROCHLORIDE 50 MG: 50 TABLET, FILM COATED ORAL at 22:39

## 2019-08-20 RX ADMIN — NAFCILLIN SODIUM: 2 INJECTION, POWDER, LYOPHILIZED, FOR SOLUTION INTRAMUSCULAR; INTRAVENOUS at 01:15

## 2019-08-20 RX ADMIN — Medication 250 MG: at 12:11

## 2019-08-20 RX ADMIN — AMPICILLIN AND SULBACTAM 12 G: 50; 100 INJECTION, POWDER, FOR SOLUTION INTRAVENOUS at 12:10

## 2019-08-20 RX ADMIN — INSULIN LISPRO 2 UNITS: 100 INJECTION, SOLUTION INTRAVENOUS; SUBCUTANEOUS at 17:35

## 2019-08-20 RX ADMIN — HEPARIN SODIUM 5000 UNITS: 5000 INJECTION INTRAVENOUS; SUBCUTANEOUS at 05:26

## 2019-08-20 ASSESSMENT — PAIN SCALES - GENERAL: PAINLEVEL_OUTOF10: 0

## 2019-08-20 NOTE — CARE COORDINATION
CM in to f/u with pt to discuss the need for [address] d/t pt will be d/c'd on an IV atb for 6 weeks. The IV atb most likely will be Nafcillin IV for 6 weeks per Dr Freddy Alford note. Pt has requested [facility]  in [facility] d/t she lives in [place]. Referral made to [facility]. Informed her of the IV atb. She informed CM that they use [facility] in [place], [address] [place] [address]. Faxed info to [facility] . WILL NEED THE IV ATB ORDERS IN THE [facility] ORDER SO THAT IT CAN BE FAXED TO [facility] PRIOR TO D/C. WILL NEED TO CALL [phone] FOR THE FAX NUMBER.  Notify [facility]  at [phone] and fax the AVS and [address] order to [phone] when pt is d/c'd.  TE

## 2019-08-20 NOTE — PROGRESS NOTES
Hospitalist Progress Note      Name:  Sravan Lu /Age/Sex: 1955  (71 y.o. female)   MRN & CSN:  [id number] & [id number] Admission Date/Time: 8/15/2019  3:24 AM   [facility] PCP: Farzad Yusuf Day: 6    Assessment and Plan:   Sravan Lu is a 71 y.o.  female  who presents with <principal problem not specified>    > MSSA TV endocarditis  -ID consulted. TTE showed tricuspid valve vegetation, may also have aortic valve vegetation. will d/w ID and cardio,  t blood cx  positive, repeat blood cx today pending,  CT chest with possible septic embolic and RLL pneumonia, will await ID recs, no clinical symptoms of pneumonia, may not need KAYLIE as TV endocarditis seen on surface echo,   IV nafcillin. Repeat blood culture still positive, WBC scan, repeat KAYLIE planned     >Cdiff colitis  -on oral vancomycin, improved    >NSTEMI  -likely type 2 from demand and ESRD, cardio following, stress test negative    >DM type 2 controlled, a1c 4.3  -SSI, regular diet per pt requests    >ESRD  -HD as per nephro, HD M/F    >Anemia  -likely due to ESRD. Iron studies c/w chronic disease, no bleeding clinically    >HTN: stable  >Hypokalemia--d/t diarrhea, replace with IV, repeat K and mag tomorrow     Diet DIET GENERAL;  Diet NPO, After Midnight   DVT Prophylaxis   Heparin,   Code Status Full Code   Disposition  pending clinical improvement     History of Present Illness:     Pt S&E. No chest pain, no dyspnea, no abd pain, no N/V.     10-14 point ROS reviewed negative, unless as noted above    Objective: Intake/Output Summary (Last 24 hours) at 2019 1054  Last data filed at 2019  Gross per 24 hour   Intake 910 ml   Output 1307 ml   Net -397 ml      Vitals:   Vitals:    19 0812   BP: (!) 134/57   Pulse: 93   Resp: 16   Temp: 98.3 °F (36.8 °C)   SpO2: 98%     Physical Exam:      GEN Awake female, cooperative, no apparent distress.    RESP Clear to

## 2019-08-21 ENCOUNTER — CARE COORDINATION (OUTPATIENT)
Dept: CARE COORDINATION | Age: 64
End: 2019-08-21

## 2019-08-21 ENCOUNTER — APPOINTMENT (OUTPATIENT)
Dept: NUCLEAR MEDICINE | Age: 64
DRG: 280 | End: 2019-08-21
Attending: INTERNAL MEDICINE
Payer: MEDICARE

## 2019-08-21 LAB
ANION GAP SERPL CALCULATED.3IONS-SCNC: 11 MMOL/L (ref 4–16)
BASOPHILS ABSOLUTE: 0.1 K/CU MM
BASOPHILS RELATIVE PERCENT: 1.2 % (ref 0–1)
BUN BLDV-MCNC: 16 MG/DL (ref 6–23)
CALCIUM SERPL-MCNC: 8.2 MG/DL (ref 8.3–10.6)
CHLORIDE BLD-SCNC: 100 MMOL/L (ref 99–110)
CO2: 24 MMOL/L (ref 21–32)
CREAT SERPL-MCNC: 3.8 MG/DL (ref 0.6–1.1)
CULTURE: ABNORMAL
CULTURE: ABNORMAL
CULTURE: NORMAL
DIFFERENTIAL TYPE: ABNORMAL
EOSINOPHILS ABSOLUTE: 0.1 K/CU MM
EOSINOPHILS RELATIVE PERCENT: 1.3 % (ref 0–3)
GFR AFRICAN AMERICAN: 14 ML/MIN/1.73M2
GFR NON-AFRICAN AMERICAN: 12 ML/MIN/1.73M2
GLUCOSE BLD-MCNC: 108 MG/DL (ref 70–99)
GLUCOSE BLD-MCNC: 179 MG/DL (ref 70–99)
GLUCOSE BLD-MCNC: 184 MG/DL (ref 70–99)
HCT VFR BLD CALC: 31.3 % (ref 37–47)
HEMOGLOBIN: 9.1 GM/DL (ref 12.5–16)
IMMATURE NEUTROPHIL %: 2.8 % (ref 0–0.43)
LV EF: 53 %
LVEF MODALITY: NORMAL
LYMPHOCYTES ABSOLUTE: 1.9 K/CU MM
LYMPHOCYTES RELATIVE PERCENT: 20 % (ref 24–44)
Lab: ABNORMAL
Lab: NORMAL
MAGNESIUM: 1.9 MG/DL (ref 1.8–2.4)
MCH RBC QN AUTO: 30.3 PG (ref 27–31)
MCHC RBC AUTO-ENTMCNC: 29.1 % (ref 32–36)
MCV RBC AUTO: 104.3 FL (ref 78–100)
MONOCYTES ABSOLUTE: 0.9 K/CU MM
MONOCYTES RELATIVE PERCENT: 9.7 % (ref 0–4)
NUCLEATED RBC %: 0 %
PDW BLD-RTO: 16.9 % (ref 11.7–14.9)
PLATELET # BLD: 450 K/CU MM (ref 140–440)
PMV BLD AUTO: 9.3 FL (ref 7.5–11.1)
POTASSIUM SERPL-SCNC: 3.8 MMOL/L (ref 3.5–5.1)
RBC # BLD: 3 M/CU MM (ref 4.2–5.4)
SEGMENTED NEUTROPHILS ABSOLUTE COUNT: 6.1 K/CU MM
SEGMENTED NEUTROPHILS RELATIVE PERCENT: 65 % (ref 36–66)
SODIUM BLD-SCNC: 135 MMOL/L (ref 135–145)
SPECIMEN: ABNORMAL
SPECIMEN: NORMAL
TOTAL IMMATURE NEUTOROPHIL: 0.26 K/CU MM
TOTAL NUCLEATED RBC: 0 K/CU MM
WBC # BLD: 9.4 K/CU MM (ref 4–10.5)

## 2019-08-21 PROCEDURE — 2140000000 HC CCU INTERMEDIATE R&B

## 2019-08-21 PROCEDURE — 6370000000 HC RX 637 (ALT 250 FOR IP): Performed by: INTERNAL MEDICINE

## 2019-08-21 PROCEDURE — 99233 SBSQ HOSP IP/OBS HIGH 50: CPT | Performed by: INTERNAL MEDICINE

## 2019-08-21 PROCEDURE — 83735 ASSAY OF MAGNESIUM: CPT

## 2019-08-21 PROCEDURE — 93325 DOPPLER ECHO COLOR FLOW MAPG: CPT | Performed by: INTERNAL MEDICINE

## 2019-08-21 PROCEDURE — 94761 N-INVAS EAR/PLS OXIMETRY MLT: CPT

## 2019-08-21 PROCEDURE — 2580000003 HC RX 258: Performed by: INTERNAL MEDICINE

## 2019-08-21 PROCEDURE — B246ZZ4 ULTRASONOGRAPHY OF RIGHT AND LEFT HEART, TRANSESOPHAGEAL: ICD-10-PCS | Performed by: INTERNAL MEDICINE

## 2019-08-21 PROCEDURE — 80048 BASIC METABOLIC PNL TOTAL CA: CPT

## 2019-08-21 PROCEDURE — 36415 COLL VENOUS BLD VENIPUNCTURE: CPT

## 2019-08-21 PROCEDURE — 7100000001 HC PACU RECOVERY - ADDTL 15 MIN

## 2019-08-21 PROCEDURE — 85025 COMPLETE CBC W/AUTO DIFF WBC: CPT

## 2019-08-21 PROCEDURE — 82962 GLUCOSE BLOOD TEST: CPT

## 2019-08-21 PROCEDURE — 2500000003 HC RX 250 WO HCPCS: Performed by: INTERNAL MEDICINE

## 2019-08-21 PROCEDURE — 93312 ECHO TRANSESOPHAGEAL: CPT | Performed by: INTERNAL MEDICINE

## 2019-08-21 PROCEDURE — 93312 ECHO TRANSESOPHAGEAL: CPT

## 2019-08-21 PROCEDURE — 78805 NM INFLAMMATORY WBC LIMITED W INDIUM 111: CPT

## 2019-08-21 PROCEDURE — 6360000002 HC RX W HCPCS: Performed by: INTERNAL MEDICINE

## 2019-08-21 PROCEDURE — A9570 INDIUM IN-111 AUTO WBC: HCPCS | Performed by: INTERNAL MEDICINE

## 2019-08-21 PROCEDURE — 97530 THERAPEUTIC ACTIVITIES: CPT

## 2019-08-21 PROCEDURE — 97161 PT EVAL LOW COMPLEX 20 MIN: CPT

## 2019-08-21 PROCEDURE — 7100000000 HC PACU RECOVERY - FIRST 15 MIN

## 2019-08-21 PROCEDURE — 3430000000 HC RX DIAGNOSTIC RADIOPHARMACEUTICAL: Performed by: INTERNAL MEDICINE

## 2019-08-21 PROCEDURE — 87040 BLOOD CULTURE FOR BACTERIA: CPT

## 2019-08-21 RX ADMIN — Medication 250 MG: at 00:56

## 2019-08-21 RX ADMIN — Medication 250 MG: at 23:38

## 2019-08-21 RX ADMIN — ONDANSETRON 4 MG: 4 TABLET, ORALLY DISINTEGRATING ORAL at 00:58

## 2019-08-21 RX ADMIN — HEPARIN SODIUM 5000 UNITS: 5000 INJECTION INTRAVENOUS; SUBCUTANEOUS at 21:48

## 2019-08-21 RX ADMIN — INSULIN LISPRO 1 UNITS: 100 INJECTION, SOLUTION INTRAVENOUS; SUBCUTANEOUS at 21:48

## 2019-08-21 RX ADMIN — Medication 250 MG: at 06:37

## 2019-08-21 RX ADMIN — HYDRALAZINE HYDROCHLORIDE 50 MG: 50 TABLET, FILM COATED ORAL at 21:47

## 2019-08-21 RX ADMIN — Medication 250 MG: at 18:13

## 2019-08-21 RX ADMIN — AMPICILLIN AND SULBACTAM 12 G: 50; 100 INJECTION, POWDER, FOR SOLUTION INTRAVENOUS at 14:54

## 2019-08-21 RX ADMIN — HYDRALAZINE HYDROCHLORIDE 50 MG: 50 TABLET, FILM COATED ORAL at 10:28

## 2019-08-21 RX ADMIN — Medication 250 MG: at 14:57

## 2019-08-21 RX ADMIN — Medication 500 MICRO CURIE: at 11:45

## 2019-08-21 RX ADMIN — ALLOPURINOL 100 MG: 100 TABLET ORAL at 10:28

## 2019-08-21 RX ADMIN — HEPARIN SODIUM 5000 UNITS: 5000 INJECTION INTRAVENOUS; SUBCUTANEOUS at 14:57

## 2019-08-21 RX ADMIN — GEMFIBROZIL 600 MG: 600 TABLET ORAL at 18:12

## 2019-08-21 RX ADMIN — HEPARIN SODIUM 5000 UNITS: 5000 INJECTION INTRAVENOUS; SUBCUTANEOUS at 06:37

## 2019-08-21 RX ADMIN — GEMFIBROZIL 600 MG: 600 TABLET ORAL at 06:36

## 2019-08-21 ASSESSMENT — PAIN SCALES - GENERAL: PAINLEVEL_OUTOF10: 0

## 2019-08-21 NOTE — PROGRESS NOTES
 Endocarditis       Active Problems  Active Problems:    Pneumonia    Leukocytosis    Troponin level elevated    Hypertension    Endocarditis  Resolved Problems:    * No resolved hospital problems.  *    Electronically signed by: Electronically signed by Sal Jacobo MD on 8/21/2019 at 9:57 AM

## 2019-08-21 NOTE — PROGRESS NOTES
lispro  0-12 Units Subcutaneous TID     insulin lispro  0-6 Units Subcutaneous Nightly    darbepoetin dustin-polysorbate  100 mcg Subcutaneous Weekly    [Held by provider] furosemide  80 mg Oral Daily      dextrose       indium-111 (WBC), ondansetron, ipratropium-albuterol, glucose, dextrose, glucagon (rDNA), dextrose, acetaminophen    Lab Data:  CBC:   Recent Labs     08/19/19 0513 08/20/19 0246 08/21/19  0339   WBC 10.4 8.8 9.4   HGB 11.2* 10.3* 9.1*   HCT 36.9* 34.0* 31.3*   .3* 102.7* 104.3*   * 454* 450*     BMP:   Recent Labs     08/19/19 0513 08/20/19 0246 08/21/19  0339    139 135   K 3.2* 3.3* 3.8    102 100   CO2 21 25 24   BUN 21 9 16   CREATININE 4.9* 2.8* 3.8*     PT/INR: No results for input(s): PROTIME, INR in the last 72 hours. BNP:  No results for input(s): PROBNP in the last 72 hours. TROPONIN:   No results for input(s): TROPONINT in the last 72 hours. ECHO :   echocardiogram    Assessment:  71 y. o.year old who is admitted forNo chief complaint on file. , active issues as noted below:  Impression:  Active Problems:    Pneumonia    Leukocytosis    Troponin level elevated    Hypertension    Endocarditis  Resolved Problems:    * No resolved hospital problems. *            All labs, medications and tests reviewed by myself , continue all other medications of all above medical condition listed as is except for changes mentioned above. Thank you very much for consult , please call with questions.     Cong Ching MD 8/21/2019 3:17 PM

## 2019-08-21 NOTE — PROGRESS NOTES
hospital admit and confirmed with pt)   Total time: 20    Electronically signed by:    Jody Mistry, [id number]  8/21/2019, 3:17 PM

## 2019-08-22 ENCOUNTER — APPOINTMENT (OUTPATIENT)
Dept: NUCLEAR MEDICINE | Age: 64
DRG: 280 | End: 2019-08-22
Attending: INTERNAL MEDICINE
Payer: MEDICARE

## 2019-08-22 VITALS
HEIGHT: 59 IN | WEIGHT: 130.07 LBS | DIASTOLIC BLOOD PRESSURE: 63 MMHG | SYSTOLIC BLOOD PRESSURE: 145 MMHG | BODY MASS INDEX: 26.22 KG/M2 | OXYGEN SATURATION: 99 % | RESPIRATION RATE: 18 BRPM | TEMPERATURE: 98.3 F | HEART RATE: 89 BPM

## 2019-08-22 LAB
ANION GAP SERPL CALCULATED.3IONS-SCNC: 13 MMOL/L (ref 4–16)
ANISOCYTOSIS: ABNORMAL
BANDED NEUTROPHILS ABSOLUTE COUNT: 0.52 K/CU MM
BANDED NEUTROPHILS RELATIVE PERCENT: 5 % (ref 5–11)
BASOPHILS ABSOLUTE: 0.3 K/CU MM
BASOPHILS RELATIVE PERCENT: 3 % (ref 0–1)
BUN BLDV-MCNC: 23 MG/DL (ref 6–23)
CALCIUM SERPL-MCNC: 8.5 MG/DL (ref 8.3–10.6)
CHLORIDE BLD-SCNC: 100 MMOL/L (ref 99–110)
CO2: 23 MMOL/L (ref 21–32)
CREAT SERPL-MCNC: 4.8 MG/DL (ref 0.6–1.1)
CULTURE: NORMAL
DIFFERENTIAL TYPE: ABNORMAL
EOSINOPHILS ABSOLUTE: 0.2 K/CU MM
EOSINOPHILS RELATIVE PERCENT: 2 % (ref 0–3)
GFR AFRICAN AMERICAN: 11 ML/MIN/1.73M2
GFR NON-AFRICAN AMERICAN: 9 ML/MIN/1.73M2
GLUCOSE BLD-MCNC: 117 MG/DL (ref 70–99)
GLUCOSE BLD-MCNC: 128 MG/DL (ref 70–99)
GLUCOSE BLD-MCNC: 136 MG/DL (ref 70–99)
GLUCOSE BLD-MCNC: 187 MG/DL (ref 70–99)
HCT VFR BLD CALC: 31.9 % (ref 37–47)
HEMOGLOBIN: 9.5 GM/DL (ref 12.5–16)
LYMPHOCYTES ABSOLUTE: 3 K/CU MM
LYMPHOCYTES RELATIVE PERCENT: 29 % (ref 24–44)
Lab: NORMAL
MAGNESIUM: 2 MG/DL (ref 1.8–2.4)
MCH RBC QN AUTO: 30.6 PG (ref 27–31)
MCHC RBC AUTO-ENTMCNC: 29.8 % (ref 32–36)
MCV RBC AUTO: 102.9 FL (ref 78–100)
MONOCYTES ABSOLUTE: 0.2 K/CU MM
MONOCYTES RELATIVE PERCENT: 2 % (ref 0–4)
PDW BLD-RTO: 17.5 % (ref 11.7–14.9)
PLATELET # BLD: 544 K/CU MM (ref 140–440)
PMV BLD AUTO: 9.3 FL (ref 7.5–11.1)
POLYCHROMASIA: ABNORMAL
POTASSIUM SERPL-SCNC: 3.9 MMOL/L (ref 3.5–5.1)
RBC # BLD: 3.1 M/CU MM (ref 4.2–5.4)
SEGMENTED NEUTROPHILS ABSOLUTE COUNT: 6.2 K/CU MM
SEGMENTED NEUTROPHILS RELATIVE PERCENT: 59 % (ref 36–66)
SODIUM BLD-SCNC: 136 MMOL/L (ref 135–145)
SPECIMEN: NORMAL
WBC # BLD: 10.4 K/CU MM (ref 4–10.5)

## 2019-08-22 PROCEDURE — 2580000003 HC RX 258: Performed by: INTERNAL MEDICINE

## 2019-08-22 PROCEDURE — 80048 BASIC METABOLIC PNL TOTAL CA: CPT

## 2019-08-22 PROCEDURE — 83735 ASSAY OF MAGNESIUM: CPT

## 2019-08-22 PROCEDURE — 6370000000 HC RX 637 (ALT 250 FOR IP): Performed by: INTERNAL MEDICINE

## 2019-08-22 PROCEDURE — 85007 BL SMEAR W/DIFF WBC COUNT: CPT

## 2019-08-22 PROCEDURE — 6360000002 HC RX W HCPCS: Performed by: INTERNAL MEDICINE

## 2019-08-22 PROCEDURE — 36415 COLL VENOUS BLD VENIPUNCTURE: CPT

## 2019-08-22 PROCEDURE — 99232 SBSQ HOSP IP/OBS MODERATE 35: CPT | Performed by: INTERNAL MEDICINE

## 2019-08-22 PROCEDURE — 82962 GLUCOSE BLOOD TEST: CPT

## 2019-08-22 PROCEDURE — 85027 COMPLETE CBC AUTOMATED: CPT

## 2019-08-22 PROCEDURE — 94761 N-INVAS EAR/PLS OXIMETRY MLT: CPT

## 2019-08-22 RX ORDER — SEVELAMER CARBONATE 800 MG/1
800 TABLET, FILM COATED ORAL
Status: DISCONTINUED | OUTPATIENT
Start: 2019-08-22 | End: 2019-08-23 | Stop reason: HOSPADM

## 2019-08-22 RX ADMIN — GEMFIBROZIL 600 MG: 600 TABLET ORAL at 17:43

## 2019-08-22 RX ADMIN — HEPARIN SODIUM 5000 UNITS: 5000 INJECTION INTRAVENOUS; SUBCUTANEOUS at 16:19

## 2019-08-22 RX ADMIN — DARBEPOETIN ALFA 100 MCG: 100 SOLUTION INTRAVENOUS; SUBCUTANEOUS at 09:42

## 2019-08-22 RX ADMIN — HEPARIN SODIUM 5000 UNITS: 5000 INJECTION INTRAVENOUS; SUBCUTANEOUS at 05:46

## 2019-08-22 RX ADMIN — Medication 250 MG: at 12:45

## 2019-08-22 RX ADMIN — AMPICILLIN AND SULBACTAM 12 G: 50; 100 INJECTION, POWDER, FOR SOLUTION INTRAVENOUS at 12:45

## 2019-08-22 RX ADMIN — HYDRALAZINE HYDROCHLORIDE 50 MG: 50 TABLET, FILM COATED ORAL at 16:23

## 2019-08-22 RX ADMIN — ALLOPURINOL 100 MG: 100 TABLET ORAL at 09:32

## 2019-08-22 RX ADMIN — SEVELAMER CARBONATE 800 MG: 800 TABLET, FILM COATED ORAL at 17:43

## 2019-08-22 RX ADMIN — Medication 250 MG: at 05:46

## 2019-08-22 RX ADMIN — Medication 250 MG: at 17:46

## 2019-08-22 RX ADMIN — HYDRALAZINE HYDROCHLORIDE 50 MG: 50 TABLET, FILM COATED ORAL at 09:32

## 2019-08-22 NOTE — DISCHARGE SUMMARY
[address]   Visit Number       [id number]       Height                59 inches   Corporate ID       [id number]        Weight                136 pounds   Accession Number   [id number]                                      [id number]                                                           RT   Ordering Physician                 Interpreting          Divya Lopez MD                                     Cardiologist   Conclusions   Summary  Normal perfusion study with normal distribution in all coronal, short, and  horizontal axis. The observed defect is consistent with diaphragmatic attenuation. Normal LV function. LVEF is 67 %. Recommendation  Medical management. Signatures   ------------------------------------------------------------------  Electronically signed by Divya Lopez MD (Interpreting  cardiologist) on 08/19/2019 at 07:40  ------------------------------------------------------------------  Procedure Procedure Type:   Nuclear Stress Test:Pharmacological, Myocardial Perfusion Imaging with  Pharm, NM MYOCARDIAL SPECT REST EXERCISE OR RX  Indications: NSTEMI and abnormal rest ECG. Risk Factors   The patient risk factors include:hypercholesterolemia, hypertension and  diabetes mellitus. Stress Protocols   Resting ECG  Normal sinus rhythm. First degree AV block. PRWP   Resting HR:74 bpm  Resting BP:146/67 mmHg  Stress Protocol:Pharmacologic - Lexiscan  Peak HR:93 bpm                               HR/BP product:93206  Peak BP:146/67 mmHg  Predicted HR: 155 bpm  % of predicted HR: 60   Exercise duration: 01:05 min  Reason for termination:Completed   ECG Findings  No ECG changes compared to rest.   Arrhythmias  No rhythm abnormality. Symptoms  Shortness of breath. Symptoms resolved with rest.   Stress Interpretation  ECG portion of stress test is negative for ischemia by diagnostic criteria. Procedure Medications   - Lexiscan I.V. bolus (over 15sec.) 0.4 mg admininstered @ 08/16/2019 09:00. Imaging Protocols   Rest                             Stress   Isotope:Sestamibi 99mTc          Isotope: Sestamibi 99mTc  Isotope dose:10.3 mCi            Isotope dose:32.9 mCi  Administration route: I.V. Administration route: I.V. Injection Date:08/16/2019 07:20  Injection Date:08/16/2019 09:00  Scan Date:08/16/2019 08:20       Scan Date:08/16/2019 10:00   Technique:        SPECT          Technique:        Gated                                                     SPECT   Perfusion Interpretation   Decreased uptake inferiorly due to diaphragmatic artifact. Imaging Results    Summed scores     - Summed stress score: 6     - Summed rest score: 0     - Summed difference score:    6   Rest ejection  Ejection fraction:67 %  EDV :79 ml  ESV :26 ml  Stroke volume :53 ml  Medical History   Accession#:  [id number]  Admission Data Admission date: 08/15/2019 Admission Time: 03:24 Hospital Status: Inpatient.         Discharge Time of 41 minutes    Electronically signed by Aniket Nolan MD on 8/22/2019 at 6:10 PM

## 2019-08-22 NOTE — PLAN OF CARE
Problem: Falls - Risk of:  Goal: Will remain free from falls  Description  Will remain free from falls  Outcome: Ongoing  Goal: Absence of physical injury  Description  Absence of physical injury  Outcome: Ongoing     Problem: Discharge Planning:  Goal: Discharged to appropriate level of care  Description  Discharged to appropriate level of care  Outcome: Ongoing  Goal: Participates in care planning  Description  Participates in care planning  Outcome: Ongoing     Problem: Airway Clearance - Ineffective:  Goal: Clear lung sounds  Description  Clear lung sounds  Outcome: Ongoing  Goal: Ability to maintain a clear airway will improve  Description  Ability to maintain a clear airway will improve  Outcome: Ongoing     Problem: Gas Exchange - Impaired:  Goal: Levels of oxygenation will improve  Description  Levels of oxygenation will improve  Outcome: Ongoing     Problem: Coping:  Goal: Ability to adjust to condition or change in health will improve  Description  Ability to adjust to condition or change in health will improve  Outcome: Ongoing     Problem: Metabolic:  Goal: Ability to maintain appropriate glucose levels will improve  Description  Ability to maintain appropriate glucose levels will improve  Outcome: Ongoing     Problem: Nutritional:  Goal: Maintenance of adequate nutrition will improve  Description  Maintenance of adequate nutrition will improve  Outcome: Ongoing  Goal: Progress toward achieving an optimal weight will improve  Description  Progress toward achieving an optimal weight will improve  Outcome: Ongoing     Problem: Skin Integrity:  Goal: Risk for impaired skin integrity will decrease  Description  Risk for impaired skin integrity will decrease  Outcome: Ongoing     Problem: Risk for Impaired Skin Integrity  Goal: Tissue integrity - skin and mucous membranes  Description  Structural intactness and normal physiological function of skin and  mucous membranes.   Outcome: Ongoing
improve  Description  Ability to maintain appropriate glucose levels will improve  8/22/2019 0522 by Ewa Hahn RN  Outcome: Ongoing  8/21/2019 2158 by Ewa Hahn RN  Outcome: Ongoing     Problem: Nutritional:  Goal: Maintenance of adequate nutrition will improve  Description  Maintenance of adequate nutrition will improve  8/22/2019 0522 by Ewa Hahn RN  Outcome: Ongoing  8/21/2019 2158 by Ewa Hahn RN  Outcome: Ongoing  Goal: Progress toward achieving an optimal weight will improve  Description  Progress toward achieving an optimal weight will improve  8/22/2019 0522 by Ewa Hahn RN  Outcome: Ongoing  8/21/2019 2158 by Ewa Hahn RN  Outcome: Ongoing     Problem: Skin Integrity:  Goal: Risk for impaired skin integrity will decrease  Description  Risk for impaired skin integrity will decrease  8/22/2019 0522 by Ewa Hahn RN  Outcome: Ongoing  8/21/2019 2158 by Ewa Hahn RN  Outcome: Ongoing     Problem: Risk for Impaired Skin Integrity  Goal: Tissue integrity - skin and mucous membranes  Description  Structural intactness and normal physiological function of skin and  mucous membranes.   8/22/2019 0522 by Ewa Hahn RN  Outcome: Ongoing  8/21/2019 2158 by Ewa Hahn RN  Outcome: Ongoing     Problem: Pain:  Goal: Pain level will decrease  Description  Pain level will decrease  8/22/2019 0522 by Ewa Hahn RN  Outcome: Ongoing  8/21/2019 2158 by Ewa Hahn RN  Outcome: Ongoing  Goal: Control of acute pain  Description  Control of acute pain  8/22/2019 0522 by Ewa Hahn RN  Outcome: Ongoing  8/21/2019 2158 by Ewa Hahn RN  Outcome: Ongoing  Goal: Control of chronic pain  Description  Control of chronic pain  8/22/2019 0522 by Ewa Hahn RN  Outcome: Ongoing  8/21/2019 2158 by Ewa Hahn RN  Outcome: Ongoing

## 2019-08-23 LAB
EKG ATRIAL RATE: 43 BPM
EKG DIAGNOSIS: NORMAL
EKG Q-T INTERVAL: 424 MS
EKG QRS DURATION: 84 MS
EKG QTC CALCULATION (BAZETT): 504 MS
EKG R AXIS: 42 DEGREES
EKG T AXIS: 48 DEGREES
EKG VENTRICULAR RATE: 85 BPM

## 2019-08-24 LAB
CULTURE: NORMAL
Lab: NORMAL
SPECIMEN: NORMAL

## 2019-08-26 LAB
CULTURE: NORMAL
CULTURE: NORMAL
Lab: NORMAL
Lab: NORMAL
SPECIMEN: NORMAL
SPECIMEN: NORMAL

## 2021-06-10 PROBLEM — Z94.0 RENAL TRANSPLANT RECIPIENT: Status: ACTIVE | Noted: 2021-06-10

## 2021-06-11 PROBLEM — E11.21 DIABETIC NEPHROPATHY ASSOCIATED WITH TYPE 2 DIABETES MELLITUS (HCC): Status: ACTIVE | Noted: 2021-06-11
